# Patient Record
Sex: FEMALE | Race: WHITE | HISPANIC OR LATINO | URBAN - METROPOLITAN AREA
[De-identification: names, ages, dates, MRNs, and addresses within clinical notes are randomized per-mention and may not be internally consistent; named-entity substitution may affect disease eponyms.]

---

## 2021-01-22 ENCOUNTER — TELEMEDICINE (OUTPATIENT)
Dept: INTERNAL MEDICINE CLINIC | Facility: CLINIC | Age: 66
End: 2021-01-22

## 2021-01-22 VITALS
TEMPERATURE: 97 F | HEIGHT: 63 IN | OXYGEN SATURATION: 95 % | WEIGHT: 115 LBS | HEART RATE: 86 BPM | BODY MASS INDEX: 20.38 KG/M2

## 2021-01-22 DIAGNOSIS — R51.9 ACUTE NONINTRACTABLE HEADACHE, UNSPECIFIED HEADACHE TYPE: ICD-10-CM

## 2021-01-22 DIAGNOSIS — R05.9 COUGH: ICD-10-CM

## 2021-01-22 DIAGNOSIS — R52 GENERALIZED BODY ACHES: ICD-10-CM

## 2021-01-22 DIAGNOSIS — R50.9 FEVER, UNSPECIFIED FEVER CAUSE: Primary | ICD-10-CM

## 2021-01-22 DIAGNOSIS — R50.9 FEVER, UNSPECIFIED FEVER CAUSE: ICD-10-CM

## 2021-01-22 PROCEDURE — 99203 OFFICE O/P NEW LOW 30 MIN: CPT | Performed by: PHYSICIAN ASSISTANT

## 2021-01-22 PROCEDURE — U0003 INFECTIOUS AGENT DETECTION BY NUCLEIC ACID (DNA OR RNA); SEVERE ACUTE RESPIRATORY SYNDROME CORONAVIRUS 2 (SARS-COV-2) (CORONAVIRUS DISEASE [COVID-19]), AMPLIFIED PROBE TECHNIQUE, MAKING USE OF HIGH THROUGHPUT TECHNOLOGIES AS DESCRIBED BY CMS-2020-01-R: HCPCS | Performed by: PHYSICIAN ASSISTANT

## 2021-01-22 PROCEDURE — U0005 INFEC AGEN DETEC AMPLI PROBE: HCPCS | Performed by: PHYSICIAN ASSISTANT

## 2021-01-22 RX ORDER — BENZONATATE 200 MG/1
200 CAPSULE ORAL 3 TIMES DAILY PRN
Qty: 30 CAPSULE | Refills: 0 | Status: SHIPPED | OUTPATIENT
Start: 2021-01-22

## 2021-01-22 NOTE — PROGRESS NOTES
COVID-19 Virtual Visit     Assessment/Plan:    Problem List Items Addressed This Visit     None      Visit Diagnoses     Fever, unspecified fever cause    -  Primary    Relevant Orders    Novel Coronavirus (Covid-19),PCR [facility] - Collected at [facility] or [facility]    Cough        Relevant Medications    benzonatate (TESSALON) 200 MG capsule    Other Relevant Orders    Novel Coronavirus (Covid-19),PCR [facility] - Collected at [facility] or [facility]    Acute nonintractable headache, unspecified headache type        Relevant Orders    Novel Coronavirus (Covid-19),PCR [facility] - Collected at [place] [address] or [facility]    Generalized body aches        Relevant Orders    Novel Coronavirus (Covid-19),PCR [facility] - Collected at [place] [address] or [facility]         Disposition:     I referred patient to one of our centralized sites for a COVID-19 swab      66y/o female here today for virtual visit as a new patient for multiple sxs concerning for possible covid  Will send pt for covid testing to [facility], all instructions of process of testing as well as recommendations for symptomatic tx  And need for quarantine all discussed and pt expresses understanding  Benzonatate prescribed for cough control, continue tylenol prn pain control/fever, also rest and fluids  She understands the meaning of quarantine and she is to remain in the home and cannot have any visitors over the home, cannot go visit anyone or go to restaurants for stores  She understands the need to treat symptomatically and and antibiotic may not be necessary at this time pending her COVID results  She understands that she may need further evaluation such as a chest x-ray in addition her COVID testing and we will determine that when we receive COVID results back    She understands that she should call the office at any time with any concerns or questions or worsening symptoms that may require more urgent treatment before she hears from us regarding her test results which should be sometime early next week  She denies any pre-existing medical conditions, however with her age, will need exam in office at some point to assess ehr general health and BW  I have spent 35 minutes directly with the patient  Greater than 50% of this time was spent in counseling/coordination of care regarding: prognosis, risks and benefits of treatment options, instructions for management, patient and family education, importance of treatment compliance and impressions  Encounter provider Lesly Kong PA-C    Provider located at [place] [id number] [phone]    Recent Visits  No visits were found meeting these conditions  Showing recent visits within past 7 days and meeting all other requirements     Today's Visits  Date Type Provider Dept   01/22/21 [facility], [address] [place] [address] today's visits and meeting all other requirements     Future Appointments  No visits were found meeting these conditions  Showing future appointments within next 150 days and meeting all other requirements      This virtual check-in was done via [employer] and patient was informed that this is a secure, HIPAA-compliant platform  She agrees to proceed  Patient agrees to participate in a virtual check in via telephone or video visit instead of presenting to the office to address urgent/immediate medical needs  Patient is aware this is a billable service  After connecting through [facility], the patient was identified by name and date of birth  Marlin Olvera was informed that this was a telemedicine visit and that the exam was being conducted confidentially over secure lines  My office door was closed  No one else was in the room  The patient was notified the following individuals were present in the room: [facility]  USED FOR TODAYS VISIT  Johnson Mcknight Ma acknowledged consent and understanding of privacy and security of the telemedicine visit  I informed the patient that I have reviewed her record in Epic and presented the opportunity for her to ask any questions regarding the visit today  The patient agreed to participate  Subjective:   Devika Casanova is a 72 y o  female who is concerned about COVID-19  Patient's symptoms include chills, fatigue, sore throat, anosmia, loss of taste, cough, shortness of breath, chest tightness, nausea (loss of appetite), diarrhea, myalgias and headache  Patient denies fever, congestion, rhinorrhea, abdominal pain and vomiting  Date of symptom onset: 1/11/2021    Exposure:   Contact with a person who is under investigation (PUI) for or who is positive for COVID-19 within the last 14 days?: Yes    Hospitalized recently for fever and/or lower respiratory symptoms?: No      Currently a healthcare worker that is involved in direct patient care?: No      Works in a special setting where the risk of COVID-19 transmission may be high? (this may include long-term care, correctional and shelter facilities; homeless shelters; assisted-living facilities and group homes ): No      Resident in a special setting where the risk of COVID-19 transmission may be high? (this may include long-term care, correctional and shelter facilities; homeless shelters; assisted-living facilities and group homes ): No      Patient is a new patient to our medical practice  States has been taking a Cypriot remedy and a few pills of antibiotic ( azithromycin) but nothing helping  She states no doctor prescribed the antibiotic  States she decided to take it on her own because of choking/coughing and chest pain  States had a fever in recent past for 6 days, however thinks she had it, does not check it  Thinks fever because was having chills or feeling hot       States came in contact with son who reportedly tested positive for COVID 19  States they have been together the entire time, they live together  However after further discussion, her son was dx about a month ago, and does not seem to have recent exposure  No results found for: [address], [address] [facility], [address] [facility], [place] [address]  History reviewed  No pertinent past medical history  History reviewed  No pertinent surgical history  Current Outpatient Medications   Medication Sig Dispense Refill    benzonatate (TESSALON) 200 MG capsule Take 1 capsule (200 mg total) by mouth 3 (three) times a day as needed for cough 30 capsule 0     No current facility-administered medications for this visit  Allergies   Allergen Reactions    Penicillins Anaphylaxis       Review of Systems   Constitutional: Positive for chills and fatigue  Negative for fever  HENT: Positive for sore throat  Negative for congestion and rhinorrhea  Respiratory: Positive for cough, chest tightness and shortness of breath  Gastrointestinal: Positive for diarrhea and nausea (loss of appetite)  Negative for abdominal pain and vomiting  Musculoskeletal: Positive for myalgias  Neurological: Positive for headaches  Objective:    Vitals:    01/22/21 1101   Pulse: 86   Temp: (!) 97 °F (36 1 °C)   TempSrc: Oral   SpO2: 95%   Weight: 52 2 kg (115 lb)   Height: 5' 3 39" (1 61 m)       Physical Exam  Constitutional:       General: She is not in acute distress  Appearance: She is ill-appearing (mild)  She is not toxic-appearing  HENT:      Head: Normocephalic and atraumatic  Eyes:      General:         Right eye: No discharge  Left eye: No discharge  Conjunctiva/sclera: Conjunctivae normal    Neck:      Musculoskeletal: Normal range of motion  No neck rigidity  Pulmonary:      Effort: No tachypnea, bradypnea, prolonged expiration or respiratory distress  Neurological:      Mental Status: She is alert and oriented to person, place, and time     Psychiatric: Mood and Affect: Mood normal          Behavior: Behavior normal  Behavior is cooperative  VIRTUAL VISIT DISCLAIMER    Rosario Kerns acknowledges that she has consented to an online visit or consultation  She understands that the online visit is based solely on information provided by her, and that, in the absence of a face-to-face physical evaluation by the physician, the diagnosis she receives is both limited and provisional in terms of accuracy and completeness  This is not intended to replace a full medical face-to-face evaluation by the physician  Lindy Frank understands and accepts these terms

## 2021-01-23 ENCOUNTER — APPOINTMENT (OUTPATIENT)
Dept: RADIOLOGY | Facility: HOSPITAL | Age: 66
End: 2021-01-23
Payer: COMMERCIAL

## 2021-01-23 ENCOUNTER — HOSPITAL ENCOUNTER (OUTPATIENT)
Facility: HOSPITAL | Age: 66
Setting detail: OBSERVATION
Discharge: HOME | End: 2021-01-24
Attending: EMERGENCY MEDICINE | Admitting: INTERNAL MEDICINE
Payer: COMMERCIAL

## 2021-01-23 DIAGNOSIS — R09.02 HYPOXIA: ICD-10-CM

## 2021-01-23 DIAGNOSIS — B34.9 VIRAL SYNDROME: ICD-10-CM

## 2021-01-23 DIAGNOSIS — U07.1 COVID-19: Primary | ICD-10-CM

## 2021-01-23 PROBLEM — E87.1 HYPONATREMIA: Status: ACTIVE | Noted: 2021-01-23

## 2021-01-23 PROBLEM — R74.01 TRANSAMINITIS: Status: ACTIVE | Noted: 2021-01-23

## 2021-01-23 LAB
ALBUMIN SERPL BCP-MCNC: 3.3 G/DL (ref 3.5–5)
ALP SERPL-CCNC: 241 U/L (ref 46–116)
ALT SERPL W P-5'-P-CCNC: 79 U/L (ref 12–78)
ANION GAP SERPL CALCULATED.3IONS-SCNC: 5 MMOL/L (ref 4–13)
AST SERPL W P-5'-P-CCNC: 74 U/L (ref 5–45)
BASOPHILS # BLD AUTO: 0 THOUSANDS/ΜL (ref 0–0.1)
BASOPHILS NFR BLD AUTO: 0 % (ref 0–1)
BILIRUB SERPL-MCNC: 0.76 MG/DL (ref 0.2–1)
BUN SERPL-MCNC: 9 MG/DL (ref 5–25)
CALCIUM ALBUM COR SERPL-MCNC: 9.2 MG/DL (ref 8.3–10.1)
CALCIUM SERPL-MCNC: 8.6 MG/DL (ref 8.3–10.1)
CHLORIDE SERPL-SCNC: 100 MMOL/L (ref 100–108)
CK MB SERPL-MCNC: <1 % (ref 0–2.5)
CK MB SERPL-MCNC: <1 NG/ML (ref 0–5)
CK SERPL-CCNC: 146 U/L (ref 26–192)
CO2 SERPL-SCNC: 27 MMOL/L (ref 21–32)
CREAT SERPL-MCNC: 0.58 MG/DL (ref 0.6–1.3)
CRP SERPL QL: 90.5 MG/L
D DIMER PPP FEU-MCNC: 1.27 UG/ML FEU
EOSINOPHIL # BLD AUTO: 0.01 THOUSAND/ΜL (ref 0–0.61)
EOSINOPHIL NFR BLD AUTO: 0 % (ref 0–6)
ERYTHROCYTE [DISTWIDTH] IN BLOOD BY AUTOMATED COUNT: 13.1 % (ref 11.6–15.1)
FERRITIN SERPL-MCNC: 1332 NG/ML (ref 8–388)
GFR SERPL CREATININE-BSD FRML MDRD: 97 ML/MIN/1.73SQ M
GLUCOSE SERPL-MCNC: 94 MG/DL (ref 65–140)
HCT VFR BLD AUTO: 40 % (ref 34.8–46.1)
HGB BLD-MCNC: 13.6 G/DL (ref 11.5–15.4)
IMM GRANULOCYTES # BLD AUTO: 0.02 THOUSAND/UL (ref 0–0.2)
IMM GRANULOCYTES NFR BLD AUTO: 0 % (ref 0–2)
INR PPP: 0.9 (ref 0.84–1.19)
LYMPHOCYTES # BLD AUTO: 1.71 THOUSANDS/ΜL (ref 0.6–4.47)
LYMPHOCYTES NFR BLD AUTO: 31 % (ref 14–44)
MAGNESIUM SERPL-MCNC: 2.1 MG/DL (ref 1.6–2.6)
MCH RBC QN AUTO: 31.6 PG (ref 26.8–34.3)
MCHC RBC AUTO-ENTMCNC: 34 G/DL (ref 31.4–37.4)
MCV RBC AUTO: 93 FL (ref 82–98)
MONOCYTES # BLD AUTO: 0.49 THOUSAND/ΜL (ref 0.17–1.22)
MONOCYTES NFR BLD AUTO: 9 % (ref 4–12)
NEUTROPHILS # BLD AUTO: 3.33 THOUSANDS/ΜL (ref 1.85–7.62)
NEUTS SEG NFR BLD AUTO: 60 % (ref 43–75)
NRBC BLD AUTO-RTO: 0 /100 WBCS
NT-PROBNP SERPL-MCNC: 68 PG/ML
PLATELET # BLD AUTO: 305 THOUSANDS/UL (ref 149–390)
PMV BLD AUTO: 10.3 FL (ref 8.9–12.7)
POTASSIUM SERPL-SCNC: 3.9 MMOL/L (ref 3.5–5.3)
PROCALCITONIN SERPL-MCNC: 0.07 NG/ML
PROT SERPL-MCNC: 7.8 G/DL (ref 6.4–8.2)
PROTHROMBIN TIME: 12.2 SECONDS (ref 11.6–14.5)
RBC # BLD AUTO: 4.3 MILLION/UL (ref 3.81–5.12)
SARS-COV-2 RNA RESP QL NAA+PROBE: POSITIVE
SODIUM SERPL-SCNC: 132 MMOL/L (ref 136–145)
TROPONIN I SERPL-MCNC: <0.02 NG/ML
WBC # BLD AUTO: 5.56 THOUSAND/UL (ref 4.31–10.16)

## 2021-01-23 PROCEDURE — 84145 PROCALCITONIN (PCT): CPT | Performed by: EMERGENCY MEDICINE

## 2021-01-23 PROCEDURE — 96361 HYDRATE IV INFUSION ADD-ON: CPT

## 2021-01-23 PROCEDURE — 82553 CREATINE MB FRACTION: CPT | Performed by: EMERGENCY MEDICINE

## 2021-01-23 PROCEDURE — 83735 ASSAY OF MAGNESIUM: CPT | Performed by: EMERGENCY MEDICINE

## 2021-01-23 PROCEDURE — 82550 ASSAY OF CK (CPK): CPT | Performed by: EMERGENCY MEDICINE

## 2021-01-23 PROCEDURE — 86140 C-REACTIVE PROTEIN: CPT | Performed by: EMERGENCY MEDICINE

## 2021-01-23 PROCEDURE — NC001 PR NO CHARGE: Performed by: INTERNAL MEDICINE

## 2021-01-23 PROCEDURE — 85379 FIBRIN DEGRADATION QUANT: CPT | Performed by: EMERGENCY MEDICINE

## 2021-01-23 PROCEDURE — 99285 EMERGENCY DEPT VISIT HI MDM: CPT | Performed by: EMERGENCY MEDICINE

## 2021-01-23 PROCEDURE — 85610 PROTHROMBIN TIME: CPT | Performed by: EMERGENCY MEDICINE

## 2021-01-23 PROCEDURE — 99285 EMERGENCY DEPT VISIT HI MDM: CPT

## 2021-01-23 PROCEDURE — 80053 COMPREHEN METABOLIC PANEL: CPT | Performed by: EMERGENCY MEDICINE

## 2021-01-23 PROCEDURE — 85025 COMPLETE CBC W/AUTO DIFF WBC: CPT | Performed by: EMERGENCY MEDICINE

## 2021-01-23 PROCEDURE — 36415 COLL VENOUS BLD VENIPUNCTURE: CPT | Performed by: EMERGENCY MEDICINE

## 2021-01-23 PROCEDURE — 83520 IMMUNOASSAY QUANT NOS NONAB: CPT | Performed by: EMERGENCY MEDICINE

## 2021-01-23 PROCEDURE — 71045 X-RAY EXAM CHEST 1 VIEW: CPT

## 2021-01-23 PROCEDURE — 83880 ASSAY OF NATRIURETIC PEPTIDE: CPT | Performed by: EMERGENCY MEDICINE

## 2021-01-23 PROCEDURE — 82728 ASSAY OF FERRITIN: CPT | Performed by: EMERGENCY MEDICINE

## 2021-01-23 PROCEDURE — 84484 ASSAY OF TROPONIN QUANT: CPT | Performed by: EMERGENCY MEDICINE

## 2021-01-23 PROCEDURE — 96374 THER/PROPH/DIAG INJ IV PUSH: CPT

## 2021-01-23 RX ORDER — ACETAMINOPHEN 325 MG/1
650 TABLET ORAL EVERY 6 HOURS PRN
Status: DISCONTINUED | OUTPATIENT
Start: 2021-01-23 | End: 2021-01-24 | Stop reason: HOSPADM

## 2021-01-23 RX ORDER — ZINC SULFATE 50(220)MG
220 CAPSULE ORAL DAILY
Status: DISCONTINUED | OUTPATIENT
Start: 2021-01-24 | End: 2021-01-24 | Stop reason: HOSPADM

## 2021-01-23 RX ORDER — BENZONATATE 100 MG/1
100 CAPSULE ORAL 3 TIMES DAILY PRN
Status: DISCONTINUED | OUTPATIENT
Start: 2021-01-23 | End: 2021-01-24 | Stop reason: HOSPADM

## 2021-01-23 RX ORDER — DEXAMETHASONE SODIUM PHOSPHATE 4 MG/ML
6 INJECTION, SOLUTION INTRA-ARTICULAR; INTRALESIONAL; INTRAMUSCULAR; INTRAVENOUS; SOFT TISSUE ONCE
Status: COMPLETED | OUTPATIENT
Start: 2021-01-23 | End: 2021-01-23

## 2021-01-23 RX ORDER — ASCORBIC ACID 500 MG
1000 TABLET ORAL EVERY 12 HOURS SCHEDULED
Status: DISCONTINUED | OUTPATIENT
Start: 2021-01-23 | End: 2021-01-24 | Stop reason: HOSPADM

## 2021-01-23 RX ORDER — ACETAMINOPHEN 325 MG/1
650 TABLET ORAL EVERY 6 HOURS PRN
Status: DISCONTINUED | OUTPATIENT
Start: 2021-01-23 | End: 2021-01-23

## 2021-01-23 RX ORDER — MELATONIN
2000 DAILY
Status: DISCONTINUED | OUTPATIENT
Start: 2021-01-24 | End: 2021-01-24 | Stop reason: HOSPADM

## 2021-01-23 RX ORDER — MULTIVITAMIN/IRON/FOLIC ACID 18MG-0.4MG
1 TABLET ORAL DAILY
Status: DISCONTINUED | OUTPATIENT
Start: 2021-01-31 | End: 2021-01-24 | Stop reason: HOSPADM

## 2021-01-23 RX ADMIN — ACETAMINOPHEN 650 MG: 325 TABLET, FILM COATED ORAL at 22:37

## 2021-01-23 RX ADMIN — OXYCODONE HYDROCHLORIDE AND ACETAMINOPHEN 1000 MG: 500 TABLET ORAL at 21:37

## 2021-01-23 RX ADMIN — ENOXAPARIN SODIUM 30 MG: 30 INJECTION SUBCUTANEOUS at 21:37

## 2021-01-23 RX ADMIN — DEXAMETHASONE SODIUM PHOSPHATE 6 MG: 4 INJECTION INTRA-ARTICULAR; INTRALESIONAL; INTRAMUSCULAR; INTRAVENOUS; SOFT TISSUE at 17:08

## 2021-01-23 RX ADMIN — SODIUM CHLORIDE 250 ML: 9 INJECTION, SOLUTION INTRAVENOUS at 17:02

## 2021-01-23 NOTE — LETTER
[address]  [address] [place] [address]  Dept: [phone]    January 24, 2021     Patient: Harley Jernigan   YOB: 1955   Date of Visit: 1/23/2021       To Whom it May Concern:    Harley Jernigan is under my professional care  She was seen in the hospital from 1/23/2021   to 01/24/21  She is unable to work from 1/11/21-1/31/21  She may return to work on 2/1/21 without limitations  If you have any questions or concerns, please don't hesitate to call           Sincerely,          Dario Alejo MD

## 2021-01-23 NOTE — PROGRESS NOTES
INTERNAL MEDICINE RESIDENCY SENIOR ADMISSION NOTE     Name: Bobo Mtz   Age & Sex: 72 y o  female   MRN: [id number]  Unit/Bed#: ED 08   Encounter: [id number]  Primary Care Provider: Rosalina Cano PA-C    Admit to team: SOD Team A    Patient seen and examined  Reviewed H&P per Dr Josué Castillo  Agree with the assessment and plan with any exception/addition as noted below:    Principal Problem:    COVID-19  Active Problems:    Hyponatremia    Transaminitis    Patient will be admitted under mild COVID pathway  As patient is currently breathing on room air will not initiate remdesivir or steroids at this time  Will give patient multivitamins  Patient also night to have slight hyponatremia which could be secondary to poor p o  Intake  Souza fluid restrict to 1800 mL and urine studies were ordered  Patient also noted to have slight transaminitis will order chronic hepatitis panel  Patient likely stable for discharge tomorrow        Code Status: Level 1 - Full Code  Admission Status: OBSERVATION  Disposition: Patient requires Med/Surg  Expected Length of Stay: 1-2 days

## 2021-01-23 NOTE — H&P
INTERNAL MEDICINE RESIDENCY ADMISSION H&P     Name: Wyatt Guallpa   Age & Sex: 72 y o  female   MRN: [id number]  Unit/Bed#: ED 08   Encounter: [id number]  Primary Care Provider: Andreina Zelaya PA-C    Code Status: Level 1 - Full Code  Admission Status: OBSERVATION  Disposition: Patient requires Med/Surg    Admit to team: SOD Team A    ASSESSMENT/PLAN     Principal Problem:    COVID-19  Active Problems:    Hyponatremia    Transaminitis      * COVID-19  Assessment & Plan  Symptoms started on 01/11/2021 with headaches, fatigue, chills, and overall feeling unwell  Progressed over the following days to include shortness of breath, dyspnea on exertion, nonproductive cough, diarrhea, nausea, sore throat, decreased PO intake, myalgias, lower back pain, anosmia, and ageusia  Recent exposure was someone she lives with  She was seen via telemedicine on 01/22/2021 for COVID concerns, and subsequently tested positive later that day  In the ED, she was afebrile and saturating above 95% on room air  Slight decrease of oxygen saturation with ambulation  Significant lab findings include increased ferritin note 1332, increased CRP at 90 5, increased D-dimer at 1 27  BMP, troponin, CK all negative  Plan:  -Patient does not meet criteria for any COVID pathway  -Received 1 dose of dexamethasone 6 mg in the ED  -Will give vitamin-C 1000 mg Q12H, vitamin-D 2000 units daily, and zinc 220 mg daily with multivitamin  -Will admit to observation  -Will need home O2 evaluation  -Likely discharge tomorrow if symptoms do not progress    Transaminitis  Assessment & Plan  AST 74, ALT 79 on admission  Alk-phos 241  Albumin 3 3  Denies history of drug use  Plan:  -Hepatitis panel      Hyponatremia  Assessment & Plan  Patient with a sodium of 132 on admission  Suspect likely secondary to decreased PO intake      Plan:  -Mild fluid restriction  -Urine sodium pending      VTE Pharmacologic Prophylaxis: Enoxaparin (Lovenox)  VTE Mechanical Prophylaxis: sequential compression device    CHIEF COMPLAINT     Chief Complaint   Patient presents with    Diarrhea     Pts son states that for the past 9 days she is not eating  Pts son states "She has pain in her back, yesterday she shaking, she eat she goes to bathroom, she not sleep nothing  Can you do the covid test?"      HISTORY OF PRESENT ILLNESS   Patient is a 49-year-old female with no reported past medical history that presents to the ED for progressive COVID-19 symptoms  Her symptoms started on 01/11/2021 with headaches, fatigue, chills, and overall feeling unwell  Her symptoms progressed over the following days to include shortness of breath, dyspnea on exertion, nonproductive cough, diarrhea, nausea, sore throat, decreased PO intake, myalgias, lower back pain, anosmia, and ageusia  Patient states that somebody she lives with has tested positive for COVID recently  She was seen via telemedicine on 01/22/2021 for COVID concerns, and subsequently tested positive later that day  In the ED, she was afebrile and saturating above 95% on room air  Significant lab findings include slight hyponatremia 132, increased AST at 74, increased ALT at 79, increased alk-phos of 241, decreased albumin at 3 3, increased ferritin note 1332, increased CRP at 90 5, increased D-dimer at 1 27  BMP, troponin, CK all negative  The patient desaturated while walking, so will be admitted for observation  The patient does not meet criteria for any COVID pathway  Will need home O2 eval, and likely discharge tomorrow if there are no progression of symptoms  REVIEW OF SYSTEMS     Review of Systems   Constitutional: Positive for appetite change, chills and fever  HENT: Positive for sore throat  Negative for congestion, rhinorrhea and sinus pain  Eyes: Negative for discharge and itching  Respiratory: Positive for cough and shortness of breath      Cardiovascular: Negative for chest pain and palpitations  Gastrointestinal: Positive for diarrhea and nausea  Negative for abdominal pain and vomiting  Genitourinary: Negative for difficulty urinating and dysuria  Musculoskeletal: Positive for back pain and myalgias  Skin: Negative for rash  Neurological: Positive for headaches  Psychiatric/Behavioral: Negative for agitation and confusion  OBJECTIVE     Vitals:    21 1519 21 1524 21 1600 21 1807   BP: 121/80  118/58 115/55   BP Location: Right arm      Pulse: 75  76 76   Resp: 18   18   Temp:  98 5 °F (36 9 °C)     TempSrc:  Oral     SpO2: 97%  97% 95%   Weight: 50 8 kg (112 lb) 51 8 kg (114 lb 3 2 oz)     Height: 5' 3 39" (1 61 m)         Temperature:   Temp (24hrs), Av 5 °F (36 9 °C), Min:98 5 °F (36 9 °C), Max:98 5 °F (36 9 °C)    Temperature: 98 5 °F (36 9 °C)  Intake & Output:  I/O        07 -  0700  07 -  0700  07 -  0700    IV Piggyback   250    Total Intake(mL/kg)   250 (4 8)    Net   +250               Weights:   IBW: 53 29 kg    Body mass index is 19 98 kg/m²  Weight (last 2 days)     Date/Time   Weight    21 1524   51 8 (114 2)    21 1519   50 8 (112)            Physical Exam  Constitutional:       General: She is not in acute distress  Appearance: She is normal weight  She is ill-appearing  HENT:      Head: Normocephalic and atraumatic  Right Ear: External ear normal       Left Ear: External ear normal       Nose: Nose normal       Mouth/Throat:      Mouth: Mucous membranes are moist       Pharynx: Oropharynx is clear  Eyes:      Conjunctiva/sclera: Conjunctivae normal       Pupils: Pupils are equal, round, and reactive to light  Neck:      Musculoskeletal: Normal range of motion  Cardiovascular:      Rate and Rhythm: Normal rate and regular rhythm  Heart sounds: No murmur     Pulmonary:      Effort: Pulmonary effort is normal       Comments: Minor rales in B/L bases  Abdominal: General: Abdomen is flat  Bowel sounds are normal  There is no distension  Tenderness: There is no abdominal tenderness  Musculoskeletal: Normal range of motion  Right lower leg: No edema  Left lower leg: No edema  Skin:     General: Skin is warm and dry  Capillary Refill: Capillary refill takes less than 2 seconds  Neurological:      General: No focal deficit present  Mental Status: She is alert and oriented to person, place, and time  Psychiatric:         Mood and Affect: Mood normal          Behavior: Behavior normal        PAST MEDICAL HISTORY   History reviewed  No pertinent past medical history  PAST SURGICAL HISTORY   History reviewed  No pertinent surgical history  SOCIAL & FAMILY HISTORY     Social History     Substance and Sexual Activity   Alcohol Use Not Currently       Social History     Substance and Sexual Activity   Drug Use Never     Social History     Tobacco Use   Smoking Status Never Smoker   Smokeless Tobacco Never Used     Family History   Problem Relation Age of Onset    Stroke Mother     Heart disease Mother     No Known Problems Father     No Known Problems Son     No Known Problems Daughter      LABORATORY DATA     Labs: I have personally reviewed pertinent reports      Results from last 7 days   Lab Units 01/23/21  1702   WBC Thousand/uL 5 56   HEMOGLOBIN g/dL 13 6   HEMATOCRIT % 40 0   PLATELETS Thousands/uL 305   NEUTROS PCT % 60   MONOS PCT % 9      Results from last 7 days   Lab Units 01/23/21  1702   POTASSIUM mmol/L 3 9   CHLORIDE mmol/L 100   CO2 mmol/L 27   BUN mg/dL 9   CREATININE mg/dL 0 58*   CALCIUM mg/dL 8 6   ALK PHOS U/L 241*   ALT U/L 79*   AST U/L 74*     Results from last 7 days   Lab Units 01/23/21  1702   MAGNESIUM mg/dL 2 1          Results from last 7 days   Lab Units 01/23/21  1702   INR  0 90         Results from last 7 days   Lab Units 01/23/21  1702   TROPONIN I ng/mL <0 02     Micro:  No results found for: Cheyenne Abdul, WOUNDCULT, [address]37 DIAGNOSTIC TESTS     Imaging: I have personally reviewed pertinent reports  No results found  EKG, Pathology, and Other Studies: I have personally reviewed pertinent reports  ALLERGIES     Allergies   Allergen Reactions    Penicillins Anaphylaxis     MEDICATIONS PRIOR TO ARRIVAL     Prior to Admission medications    Medication Sig Start Date End Date Taking? Authorizing Provider   benzonatate (TESSALON) 200 MG capsule Take 1 capsule (200 mg total) by mouth 3 (three) times a day as needed for cough 1/22/21  Yes Andreina Zelaya PA-C     MEDICATIONS ADMINISTERED IN LAST 24 HOURS     Medication Administration - last 24 hours from 01/22/2021 1951 to 01/23/2021 1951       Date/Time Order Dose Route Action Action by     01/23/2021 1708 dexamethasone (DECADRON) injection 6 mg 6 mg Intravenous Given Mikayla Gay RN     01/23/2021 1809 sodium chloride 0 9 % bolus 250 mL 0 mL Intravenous Stopped Mikayla Gay RN     01/23/2021 1702 sodium chloride 0 9 % bolus 250 mL 250 mL Intravenous New Bag Mikayla Gay RN        CURRENT MEDICATIONS            Admission Time  I spent 30 minutes admitting the patient  This involved direct patient contact where I performed a full history and physical, reviewing previous records, and reviewing laboratory and other diagnostic studies  Portions of the record may have been created with voice recognition software  Occasional wrong word or "sound a like" substitutions may have occurred due to the inherent limitations of voice recognition software    Read the chart carefully and recognize, using context, where substitutions have occurred     ==  Scott Craft, [address] [facility]  Internal Medicine Residency PGY-1

## 2021-01-23 NOTE — ED NOTES
Patient had COVID test yesterday at [facility] and it appears the result is positive  Patient reports to have diarrhea whenever she eats       Cortes Hardy RN  01/23/21 1972

## 2021-01-23 NOTE — ED NOTES
Ambulatory 02- 95%--> dropped down to 88% after ambulating in room briefly     Jamaal Garcia RN  01/23/21 6205

## 2021-01-23 NOTE — ED PROVIDER NOTES
History  Chief Complaint   Patient presents with    Diarrhea     Pts son states that for the past 9 days she is not eating  Pts son states "She has pain in her back, yesterday she shaking, she eat she goes to bathroom, she not sleep nothing  Can you do the covid test?"     63-year-old female no reported significant past medical history presents with viral syndrome, accompanied by son  Patient tested positive for COVID yesterday  States that she has had poor appetite for the past 10 days or so also complains of myalgias particularly in her low back, as well as chills  Her most bothersome symptom is fatigue  She does have some shortness of breath and associated cough  Patient comes from [place], visiting family here  Son had a viral syndrome prior to her arrival   Has been taking Tylenol for her symptoms with minimal relief  Denies taking medications on a regular basis, does not smoke  Denies chest pain, abdominal pain, leg pain or swelling, history of clot, nausea, vomiting, diarrhea, bloody or dark stool, urinary symptoms  Prior to Admission Medications   Prescriptions Last Dose Informant Patient Reported? Taking?   benzonatate (TESSALON) 200 MG capsule 1/23/2021 at 1400  No Yes   Sig: Take 1 capsule (200 mg total) by mouth 3 (three) times a day as needed for cough      Facility-Administered Medications: None       History reviewed  No pertinent past medical history  History reviewed  No pertinent surgical history  Family History   Problem Relation Age of Onset    Stroke Mother     Heart disease Mother     No Known Problems Father     No Known Problems Son     No Known Problems Daughter      I have reviewed and agree with the history as documented      E-Cigarette/Vaping     E-Cigarette/Vaping Substances    Nicotine No     THC No     CBD No     Flavoring No     Other No     Unknown No      Social History     Tobacco Use    Smoking status: Never Smoker    Smokeless tobacco: Never Used   Substance Use Topics    Alcohol use: Not Currently    Drug use: Never        Review of Systems   Constitutional: Positive for appetite change, chills and fatigue  Negative for fever  HENT: Negative for ear pain, sinus pain and sore throat  Eyes: Negative for pain  Respiratory: Positive for cough and shortness of breath  Cardiovascular: Negative for chest pain and leg swelling  Gastrointestinal: Negative for abdominal pain, diarrhea, nausea and vomiting  Genitourinary: Negative for difficulty urinating and flank pain  Musculoskeletal: Positive for myalgias  Negative for back pain and neck pain  Neurological: Negative for headaches  All other systems reviewed and are negative  Physical Exam  ED Triage Vitals   Temperature Pulse Respirations Blood Pressure SpO2   01/23/21 1524 01/23/21 1519 01/23/21 1519 01/23/21 1519 01/23/21 1519   98 5 °F (36 9 °C) 75 18 121/80 97 %      Temp Source Heart Rate Source Patient Position - Orthostatic VS BP Location FiO2 (%)   01/23/21 1524 01/23/21 1519 01/23/21 1519 01/23/21 1519 --   Oral Monitor Sitting Right arm       Pain Score       01/23/21 1519       4             Orthostatic Vital Signs  Vitals:    01/23/21 1519 01/23/21 1600 01/23/21 1807   BP: 121/80 118/58 115/55   Pulse: 75 76 76   Patient Position - Orthostatic VS: Sitting         Physical Exam  Vitals signs and nursing note reviewed  Constitutional:       General: She is not in acute distress  Appearance: She is well-developed  She is ill-appearing  She is not toxic-appearing or diaphoretic  Comments: Tired appearing   HENT:      Head: Normocephalic  Nose: Nose normal       Mouth/Throat:      Mouth: Mucous membranes are moist    Eyes:      General:         Right eye: No discharge  Left eye: No discharge  Extraocular Movements: Extraocular movements intact        Conjunctiva/sclera: Conjunctivae normal    Neck:      Musculoskeletal: Normal range of motion and neck supple  Cardiovascular:      Rate and Rhythm: Normal rate  Pulses: Normal pulses  Pulmonary:      Effort: Pulmonary effort is normal  No respiratory distress  Breath sounds: No stridor  Rales (Bilateral, worse on right) present  Abdominal:      General: There is no distension  Palpations: Abdomen is soft  Tenderness: There is no abdominal tenderness  There is no guarding or rebound  Musculoskeletal:      Right lower leg: No edema  Left lower leg: No edema  Skin:     General: Skin is warm and dry  Capillary Refill: Capillary refill takes less than 2 seconds  Neurological:      General: No focal deficit present  Mental Status: She is alert and oriented to person, place, and time  Cranial Nerves: No cranial nerve deficit  Psychiatric:         Mood and Affect: Mood normal          Behavior: Behavior normal          Thought Content:  Thought content normal          Judgment: Judgment normal          ED Medications  Medications   acetaminophen (TYLENOL) tablet 650 mg (has no administration in time range)   cholecalciferol (VITAMIN D3) tablet 2,000 Units (has no administration in time range)   ascorbic acid (VITAMIN C) tablet 1,000 mg (has no administration in time range)   zinc sulfate (ZINCATE) capsule 220 mg (has no administration in time range)     Followed by   multivitamin-minerals (CENTRUM ADULTS) tablet 1 tablet (has no administration in time range)   enoxaparin (LOVENOX) subcutaneous injection 30 mg (has no administration in time range)   benzonatate (TESSALON PERLES) capsule 100 mg (has no administration in time range)   dexamethasone (DECADRON) injection 6 mg (6 mg Intravenous Given 1/23/21 1708)   sodium chloride 0 9 % bolus 250 mL (0 mL Intravenous Stopped 1/23/21 1809)       Diagnostic Studies  Results Reviewed     Procedure Component Value Units Date/Time    Procalcitonin with AM Reflex [[id number]]  (Normal) Collected: 01/23/21 1702    Lab Status: Final result Specimen: Blood from Arm, Right Updated: 01/23/21 1914     Procalcitonin 0 07 ng/ml     Procalcitonin Reflex [[id number]]     Lab Status: No result Specimen: Blood     Sodium, urine, random [[id number]]     Lab Status: No result Specimen: Urine     Creatinine, urine, random [[id number]]     Lab Status: No result Specimen: Urine     CKMB [[id number]]  (Normal) Collected: 01/23/21 1702    Lab Status: Final result Specimen: Blood from Arm, Right Updated: 01/23/21 1757     CK-MB Index <1 0 %      CK-MB <1 0 ng/mL     D-dimer, quantitative [[id number]]  (Abnormal) Collected: 01/23/21 1702    Lab Status: Final result Specimen: Blood from Arm, Right Updated: 01/23/21 1747     D-Dimer, Quant 1 27 ug/ml FEU     Protime-INR [[id number]]  (Normal) Collected: 01/23/21 1702    Lab Status: Final result Specimen: Blood from Arm, Right Updated: 01/23/21 1742     Protime 12 2 seconds      INR 0 90    Comprehensive metabolic panel [[id number]]  (Abnormal) Collected: 01/23/21 1702    Lab Status: Final result Specimen: Blood from Arm, Right Updated: 01/23/21 1741     Sodium 132 mmol/L      Potassium 3 9 mmol/L      Chloride 100 mmol/L      CO2 27 mmol/L      ANION GAP 5 mmol/L      BUN 9 mg/dL      Creatinine 0 58 mg/dL      Glucose 94 mg/dL      Calcium 8 6 mg/dL      Corrected Calcium 9 2 mg/dL      AST 74 U/L      ALT 79 U/L      Alkaline Phosphatase 241 U/L      Total Protein 7 8 g/dL      Albumin 3 3 g/dL      Total Bilirubin 0 76 mg/dL      eGFR 97 ml/min/1 73sq m     Narrative:      [place] guidelines for Chronic Kidney Disease (CKD):     Stage 1 with normal or high GFR (GFR > 90 mL/min/1 73 square meters)    Stage 2 Mild CKD (GFR = 60-89 mL/min/1 73 square meters)    Stage 3A Moderate CKD (GFR = 45-59 mL/min/1 73 square meters)    Stage 3B Moderate CKD (GFR = 30-44 mL/min/1 73 square meters)    Stage 4 Severe CKD (GFR = 15-29 mL/min/1 73 square meters)    Stage 5 End Stage CKD (GFR <15 mL/min/1 73 square meters)  Note: GFR calculation is accurate only with a steady state creatinine    C-reactive protein [[id number]]  (Abnormal) Collected: 01/23/21 1702    Lab Status: Final result Specimen: Blood from Arm, Right Updated: 01/23/21 1741     CRP 90 5 mg/L     Ferritin [[id number]]  (Abnormal) Collected: 01/23/21 1702    Lab Status: Final result Specimen: Blood from Arm, Right Updated: 01/23/21 1741     Ferritin 1,332 ng/mL     NT-BNP PRO [[id number]]  (Normal) Collected: 01/23/21 1702    Lab Status: Final result Specimen: Blood from Arm, Right Updated: 01/23/21 1741     NT-proBNP 68 pg/mL     CK (with reflex to MB) [[id number]]  (Normal) Collected: 01/23/21 1702    Lab Status: Final result Specimen: Blood from Arm, Right Updated: 01/23/21 1741     Total  U/L     Magnesium [[id number]]  (Normal) Collected: 01/23/21 1702    Lab Status: Final result Specimen: Blood from Arm, Right Updated: 01/23/21 1741     Magnesium 2 1 mg/dL     Troponin I [[id number]]  (Normal) Collected: 01/23/21 1702    Lab Status: Final result Specimen: Blood from Arm, Right Updated: 01/23/21 1740     Troponin I <0 02 ng/mL     CBC and differential [[id number]] Collected: 01/23/21 1702    Lab Status: Final result Specimen: Blood from Arm, Right Updated: 01/23/21 1723     WBC 5 56 Thousand/uL      RBC 4 30 Million/uL      Hemoglobin 13 6 g/dL      Hematocrit 40 0 %      MCV 93 fL      MCH 31 6 pg      MCHC 34 0 g/dL      RDW 13 1 %      MPV 10 3 fL      Platelets 326 Thousands/uL      nRBC 0 /100 WBCs      Neutrophils Relative 60 %      Immat GRANS % 0 %      Lymphocytes Relative 31 %      Monocytes Relative 9 %      Eosinophils Relative 0 %      Basophils Relative 0 %      Neutrophils Absolute 3 33 Thousands/µL      Immature Grans Absolute 0 02 Thousand/uL      Lymphocytes Absolute 1 71 Thousands/µL      Monocytes Absolute 0 49 Thousand/µL      Eosinophils Absolute 0 01 Thousand/µL      Basophils Absolute 0 00 Thousands/µL Interleukin-6,Serum [[id number]] Collected: 01/23/21 1702    Lab Status: In process Specimen: Blood from Arm, Right Updated: 01/23/21 1709                 XR chest 1 view portable    (Results Pending)         Procedures  Procedures      ED Course               Identification of Seniors at Risk      Most Recent Value   (ISAR) Identification of Seniors at Risk   Before the illness or injury that brought you to the Emergency, did you need someone to help you on a regular basis? 0 Filed at: 01/23/2021 1524   In the last 24 hours, have you needed more help than usual?  1 Filed at: 01/23/2021 1524   Have you been hospitalized for one or more nights during the past 6 months? 0 Filed at: 01/23/2021 1524   In general, do you see well? 1 Filed at: 01/23/2021 1524   In general, do you have serious problems with your memory? 0 Filed at: 01/23/2021 1524   Do you take more than three different medications every day?  0 Filed at: 01/23/2021 1524   ISAR Score  2 Filed at: 01/23/2021 1524                    SBIRT 22yo+      Most Recent Value   SBIRT (25 yo +)   In order to provide better care to our patients, we are screening all of our patients for alcohol and drug use  Would it be okay to ask you these screening questions? Yes Filed at: 01/23/2021 1700   Initial Alcohol Screen: US AUDIT-C    1  How often do you have a drink containing alcohol?  0 Filed at: 01/23/2021 1700   2  How many drinks containing alcohol do you have on a typical day you are drinking? 0 Filed at: 01/23/2021 1700   3a  Male UNDER 65: How often do you have five or more drinks on one occasion? 0 Filed at: 01/23/2021 1700   3b  FEMALE Any Age, or MALE 65+: How often do you have 4 or more drinks on one occassion? 0 Filed at: 01/23/2021 1700   Audit-C Score  0 Filed at: 01/23/2021 1700   SERA: How many times in the past year have you    Used an illegal drug or used a prescription medication for non-medical reasons?   Never Filed at: 01/23/2021 1700 MDM  Number of Diagnoses or Management Options  COVID-19:   Hypoxia:   Viral syndrome:   Diagnosis management comments: On room air down to 92% at rest  On ambulatory pulse ox noted to desaturate down to 88%  Expect to admit via mild pathway  Disposition  Final diagnoses:   COVID-19   Viral syndrome   Hypoxia     Time reflects when diagnosis was documented in both MDM as applicable and the Disposition within this note     Time User Action Codes Description Comment    1/23/2021  6:57 PM Rashaun Stagenesis Add [U07 1] COVID-19     1/23/2021  6:57 PM Kelsy MCKEON Add [B34 9] Viral syndrome     1/23/2021  6:57 PM Rashaun Stammer Add [R09 02] Hypoxia       ED Disposition     ED Disposition Condition Date/Time Comment    Admit Stable Sat Jan 23, 2021  6:56 PM Case was discussed with Dr Jennie Condon and the patient's admission status was agreed to be Admission Status: observation status to the service of Dr Kaley Ortiz   Follow-up Information    None         Patient's Medications   Discharge Prescriptions    No medications on file     No discharge procedures on file  PDMP Review     None           ED Provider  Attending physically available and evaluated [id number] [facility]  EVELYN managed the patient along with the ED Attending      Electronically Signed by         Fred Gonzales MD  01/23/21 0487

## 2021-01-24 VITALS
BODY MASS INDEX: 19.84 KG/M2 | WEIGHT: 112 LBS | OXYGEN SATURATION: 96 % | HEART RATE: 71 BPM | RESPIRATION RATE: 18 BRPM | DIASTOLIC BLOOD PRESSURE: 64 MMHG | TEMPERATURE: 98.4 F | SYSTOLIC BLOOD PRESSURE: 114 MMHG | HEIGHT: 63 IN

## 2021-01-24 LAB
ALBUMIN SERPL BCP-MCNC: 3 G/DL (ref 3.5–5)
ALP SERPL-CCNC: 226 U/L (ref 46–116)
ALT SERPL W P-5'-P-CCNC: 67 U/L (ref 12–78)
ANION GAP SERPL CALCULATED.3IONS-SCNC: 6 MMOL/L (ref 4–13)
AST SERPL W P-5'-P-CCNC: 58 U/L (ref 5–45)
BASOPHILS # BLD MANUAL: 0 THOUSAND/UL (ref 0–0.1)
BASOPHILS NFR MAR MANUAL: 0 % (ref 0–1)
BILIRUB SERPL-MCNC: 0.66 MG/DL (ref 0.2–1)
BUN SERPL-MCNC: 10 MG/DL (ref 5–25)
BURR CELLS BLD QL SMEAR: PRESENT
CALCIUM ALBUM COR SERPL-MCNC: 9.8 MG/DL (ref 8.3–10.1)
CALCIUM SERPL-MCNC: 9 MG/DL (ref 8.3–10.1)
CHLORIDE SERPL-SCNC: 107 MMOL/L (ref 100–108)
CO2 SERPL-SCNC: 26 MMOL/L (ref 21–32)
CREAT SERPL-MCNC: 0.53 MG/DL (ref 0.6–1.3)
EOSINOPHIL # BLD MANUAL: 0 THOUSAND/UL (ref 0–0.4)
EOSINOPHIL NFR BLD MANUAL: 0 % (ref 0–6)
ERYTHROCYTE [DISTWIDTH] IN BLOOD BY AUTOMATED COUNT: 13 % (ref 11.6–15.1)
GFR SERPL CREATININE-BSD FRML MDRD: 100 ML/MIN/1.73SQ M
GLUCOSE SERPL-MCNC: 132 MG/DL (ref 65–140)
HBV CORE AB SER QL: NORMAL
HBV CORE IGM SER QL: NORMAL
HBV SURFACE AG SER QL: NORMAL
HCT VFR BLD AUTO: 40.5 % (ref 34.8–46.1)
HCV AB SER QL: NORMAL
HGB BLD-MCNC: 13.6 G/DL (ref 11.5–15.4)
LYMPHOCYTES # BLD AUTO: 0.31 THOUSAND/UL (ref 0.6–4.47)
LYMPHOCYTES # BLD AUTO: 13 % (ref 14–44)
MACROCYTES BLD QL AUTO: PRESENT
MCH RBC QN AUTO: 31.2 PG (ref 26.8–34.3)
MCHC RBC AUTO-ENTMCNC: 33.6 G/DL (ref 31.4–37.4)
MCV RBC AUTO: 93 FL (ref 82–98)
METAMYELOCYTES NFR BLD MANUAL: 1 % (ref 0–1)
MICROCYTES BLD QL AUTO: PRESENT
MONOCYTES # BLD AUTO: 0.26 THOUSAND/UL (ref 0–1.22)
MONOCYTES NFR BLD: 11 % (ref 4–12)
NEUTROPHILS # BLD MANUAL: 1.48 THOUSAND/UL (ref 1.85–7.62)
NEUTS SEG NFR BLD AUTO: 62 % (ref 43–75)
NRBC BLD AUTO-RTO: 0 /100 WBCS
PLATELET # BLD AUTO: 327 THOUSANDS/UL (ref 149–390)
PLATELET BLD QL SMEAR: ADEQUATE
PMV BLD AUTO: 10.2 FL (ref 8.9–12.7)
POLYCHROMASIA BLD QL SMEAR: PRESENT
POTASSIUM SERPL-SCNC: 4.7 MMOL/L (ref 3.5–5.3)
PROCALCITONIN SERPL-MCNC: 0.08 NG/ML
PROT SERPL-MCNC: 7.1 G/DL (ref 6.4–8.2)
RBC # BLD AUTO: 4.36 MILLION/UL (ref 3.81–5.12)
RBC MORPH BLD: PRESENT
SODIUM SERPL-SCNC: 139 MMOL/L (ref 136–145)
TARGETS BLD QL SMEAR: PRESENT
VARIANT LYMPHS # BLD AUTO: 13 %
WBC # BLD AUTO: 2.39 THOUSAND/UL (ref 4.31–10.16)

## 2021-01-24 PROCEDURE — 87340 HEPATITIS B SURFACE AG IA: CPT | Performed by: INTERNAL MEDICINE

## 2021-01-24 PROCEDURE — 85027 COMPLETE CBC AUTOMATED: CPT | Performed by: INTERNAL MEDICINE

## 2021-01-24 PROCEDURE — 86803 HEPATITIS C AB TEST: CPT | Performed by: INTERNAL MEDICINE

## 2021-01-24 PROCEDURE — 86704 HEP B CORE ANTIBODY TOTAL: CPT | Performed by: INTERNAL MEDICINE

## 2021-01-24 PROCEDURE — NC001 PR NO CHARGE: Performed by: INTERNAL MEDICINE

## 2021-01-24 PROCEDURE — 85007 BL SMEAR W/DIFF WBC COUNT: CPT | Performed by: INTERNAL MEDICINE

## 2021-01-24 PROCEDURE — 86705 HEP B CORE ANTIBODY IGM: CPT | Performed by: INTERNAL MEDICINE

## 2021-01-24 PROCEDURE — 80053 COMPREHEN METABOLIC PANEL: CPT | Performed by: INTERNAL MEDICINE

## 2021-01-24 PROCEDURE — 84145 PROCALCITONIN (PCT): CPT | Performed by: INTERNAL MEDICINE

## 2021-01-24 PROCEDURE — 99219 PR INITIAL OBSERVATION CARE/DAY 50 MINUTES: CPT | Performed by: INTERNAL MEDICINE

## 2021-01-24 RX ORDER — MELATONIN
2000 DAILY
Qty: 12 TABLET | Refills: 0 | Status: SHIPPED | OUTPATIENT
Start: 2021-01-25 | End: 2021-01-31

## 2021-01-24 RX ORDER — ZINC SULFATE 50(220)MG
220 CAPSULE ORAL DAILY
Qty: 6 CAPSULE | Refills: 0 | Status: SHIPPED | OUTPATIENT
Start: 2021-01-25 | End: 2021-01-31

## 2021-01-24 RX ADMIN — OXYCODONE HYDROCHLORIDE AND ACETAMINOPHEN 1000 MG: 500 TABLET ORAL at 09:07

## 2021-01-24 RX ADMIN — ZINC SULFATE 220 MG (50 MG) CAPSULE 220 MG: CAPSULE at 09:07

## 2021-01-24 RX ADMIN — Medication 2000 UNITS: at 09:07

## 2021-01-24 RX ADMIN — ENOXAPARIN SODIUM 30 MG: 30 INJECTION SUBCUTANEOUS at 09:07

## 2021-01-24 NOTE — ASSESSMENT & PLAN NOTE
Symptoms started on 01/11/2021 with headaches, fatigue, chills, and overall feeling unwell  Progressed over the following days to include shortness of breath, dyspnea on exertion, nonproductive cough, diarrhea, nausea, sore throat, decreased PO intake, myalgias, lower back pain, anosmia, and ageusia  Recent exposure was someone she lives with  She was seen via telemedicine on 01/22/2021 for COVID concerns, and subsequently tested positive later that day  In the ED, she was afebrile and saturating above 95% on room air  Slight decrease of oxygen saturation with ambulation  Significant lab findings include increased ferritin note 1332, increased CRP at 90 5, increased D-dimer at 1 27  BMP, troponin, CK all negative      Plan:  -Patient does not meet criteria for any COVID pathway  -Received 1 dose of dexamethasone 6 mg in the ED  -Will give vitamin-C 1000 mg Q12H, vitamin-D 2000 units daily, and zinc 220 mg daily with multivitamin  -Will admit to observation  -Will need home O2 evaluation  -Likely discharge tomorrow if symptoms do not progress

## 2021-01-24 NOTE — DISCHARGE INSTRUCTIONS
Please take medications as prescribed  Please quarantine at home  You may come out of isolation when you wake up on 2/1/21  Please follow up with your PCP within 3 days of discharge  When you are laying down, please try to lay on your stomach for improved breathing  COVID-19 (Enfermedad por coronavirus 2019)   LO QUE NECESITA SABER:   COVID-19 es la enfermedad causada por el nuevo coronavirus descubierto por primera vez en diciembre de 2019  Los coronavirus generalmente causan infecciones de las vías respiratorias superiores (nariz, garganta y pulmones), rand un resfriado  El nuevo virus también puede causar afecciones respiratorias inferiores graves, rand la neumonía o el síndrome de dificultad respiratoria aguda (SDRA)  Cualquier persona puede desarrollar problemas graves a causa del nuevo virus, dawood el riesgo es mayor si tiene 72 años o [place]  Un sistema inmunitario débil, la diabetes o alyssa enfermedad cardíaca o pulmonar también pueden aumentar el riesgo  INSTRUCCIONES SOBRE EL DOM HOSPITALARIA:   Si digna que usted o alguien que conoce puede estar infectado: Lavern lo siguiente para proteger a otras personas:  · Si se requiere atención de emergencia, avise al operador de la posible infección, o llame antes y avise al servicio de urgencias  · Llame a un médico para recibir instrucciones si los síntomas son leves  Cualquier persona que pueda estar infectada no  debe llegar sin llamar ramos  El médico deberá proteger a los miembros del personal y a otros pacientes  · La persona que puede estar infectada debe usar un tapabocas mientras reciben [place]  [employer] ayudará a reducir el riesgo de infectar a otras personas  Nadie que sea petra de 2 años, que tenga problemas respiratorios o que no pueda quitárselo debe usar un tapabocas  El médico puede darle instrucciones para cualquier persona que no pueda usar un tapabocas      Llame al [facility] de emergencias (911 en los [place] del indira) o pídale a alguien que lo lleve al departamento de emergencias si:  · Usted tiene dificultad para respirar o falta de aliento mientras descansa  · Usted siente presión o dolor en el pecho que dura más de 5 minutos  · Usted tiene confusión o es difícil despertarlo  · Ambrocio labios o jeronimo están azules  · Usted tiene fiebre de 104 ºF (40 °C) o más  Llame a olivo médico si:  · No  tiene síntomas de COVID-19 dawood tuvo contacto físico cercano dentro de los 14 días con alguien que jasvir positivo  · Usted tiene preguntas o inquietudes acerca de olivo condición o cuidado  Medicamentos: Es posible que usted necesite alguno de los siguientes:  · Los descongestionantes ayudan a reducir la congestión nasal y [place] a respirar más fácilmente  Si adelaida pastillas descongestionantes, pueden causarle agitación o problemas para dormir  No use aerosol descongestionante por más de unos cuantos días  · Los jarabes para la tos ayudan a reducir la tos  Pregúntele a olivo médico cuál tipo de medicamento para la tos es mejor para usted  · Acetaminofén mark el dolor y baja la fiebre  Está disponible sin receta médica  Pregunte la cantidad y la frecuencia con que debe tomarlos  [address]  Yuliya las etiquetas de todos los demás medicamentos que esté usando para saber si también contienen acetaminofén, o pregunte a olivo médico o farmacéutico  El acetaminofén puede causar daño en el hígado cuando no se adelaida de forma correcta  No use más de 4 gramos (4000 miligramos) en total de acetaminofeno en un día  · Los [place], rand el ibuprofeno, [place] a disminuir la inflamación, el dolor y la [place]  Los KIRSTIN pueden causar sangrado estomacal o problemas renales en ciertas personas  Si usted adelaida un medicamento anticoagulante, siempre pregúntele a olivo médico si los KIRSTIN son seguros para usted  Siempre yuliya la etiqueta de oneyda medicamento y [place] instrucciones  · [place] ambrocio medicamentos rand se le haya indicado   Consulte con olivo médico si usted digna que olivo medicamento no le está ayudando o si presenta efectos secundarios  Infórmele si es alérgico a cualquier medicamento  Mantenga alyssa lista actualizada de los [place], las vitaminas y los productos herbales que adelaida  Incluya los siguientes datos de los medicamentos: cantidad, frecuencia y motivo de administración  Traiga con usted la lista o los envases de las píldoras a ambrocio citas de seguimiento  Lleve la lista de los medicamentos con usted en lindsey de alyssa emergencia  Cómo se propaga el coronavirus 2019: El virus se propaga rápida y fácilmente  Puede infectarse si está en contacto con alyssa gran cantidad del virus, incluso claudia poco tiempo  También puede infectarse por estar cerca de alyssa pequeña cantidad del virus claudia mucho tiempo  A continuación se indican las formas en que se digna que se propaga el virus, dawood es posible que surja más información:  · Las gotitas son la forma más común de propagación de todos los coronavirus  El virus puede viajar en gotitas que se pete cuando alyssa persona habla, tose o estornuda  Cualquiera que respire las gotitas o que las gotitas se le metan en los ojos puede infectarse con el virus  Se digna que el contacto personal cercano con alyssa persona infectada es la principal forma de propagación del virus  El contacto personal cercano significa estar a menos de 6 pies (2 metros) de otra persona  · El contacto de persona a persona puede propagar el virus  Por ejemplo, alyssa persona con el virus en ambrocio canelo puede propagarlo al darle la mano a alguien  En oneyda momento, no parece que el virus pueda transmitirse a un bebé claudia el embarazo o [address]  El bebé puede infectarse después de nacer por contacto de persona a persona  El virus tampoco parece propagarse por la [place]  Si está embarazada o amamantando, hable con olivo médico u obstetra sobre cualquier preocupación que tenga  · El virus puede permanecer en objetos y superficies   [address] puede contraer el virus en ambrocio canelo al tocar el objeto o la superficie  La infección se produce si la persona se toca los ojos o la boca sin antes lavarse las canelo  Aún no se sabe cuánto tiempo puede permanecer el virus en un objeto o superficie  Por eso es importante limpiar todas las superficies que se usan regularmente  · Un animal infectado puede ser Daenn Bellmore de infectar a alyssa persona que lo toque  [place] puede ocurrir en [facility] vivos o en alyssa heron  Cómo todo el brayden puede reducir el riesgo de COVID-19: La mejor manera de prevenir la infección es evitar a cualquiera que esté infectado, dawood esto puede ser difícil de lograr  Alyssa persona infectada puede propagar el virus antes de que aparezcan los signos o síntomas, o incluso si los signos o síntomas nunca se desarrollan  Lo siguiente puede ayudar a reducir el riesgo de infección:      · [employer] las canelo con frecuencia claudia el día  Utilice agua y Aron  Frótese las canelo enjabonadas, [facility] dedos  Lávese el frente y el dorso de cada [place], y [place] dedos  Use los dedos de alyssa mano para restregar debajo de las uñas de la [place]  Lávese claudia al menos 20 segundos  Enjuague con agua corriente caliente claudia varios segundos  Luego séquese las canelo con alyssa toalla limpia o alyssa toalla de papel  Puede usar un desinfectante para canelo que contenga alcohol, si no hay agua y jabón disponibles  No se toque los ojos, la nariz o la boca sin antes [employer]  Enseñe a los niños a lavarse las canelo y a usar el desinfectante de [address]  · Cúbrase al toser o estornudar  [place] liam que las gotitas viajen de usted a los demás  Gire la jeronimo y cúbrase la boca y la Melany Camilo con un pañuelo  Deseche el pañuelo  Use el ángulo del brazo si no tiene un pañuelo disponible  Luego lávese las canelo con agua y Aron o use un desinfectante de canelo  Gire la yu y cúbrase si está cerca de alguien que está estornudando o tosiendo   Enséñeles a los niños a cubrirse al toser o estornudar  · [address] distanciamiento social a nivel local, nacional y [place]  El distanciamiento social significa que las personas evitan el contacto físico cercano para que el virus no se propague de  Bold persona a otra  Mantenga al menos 6 pies (2 metros) de distancia entre usted y los demás  También mantenga esta distancia de cualquiera que venga a olivo casa, rand alguien que lavern alyssa entrega  · Acostúmbrese a no tocarse la jeronimo  No se sabe cuánto tiempo puede permanecer el virus en los objetos y las superficies  Si tiene el virus [employer], puede transferirlo a los ojos, la nariz o la boca e infectarse  También puede transferirlo a los objetos, las [address]  Tenga cuidado con lo que toca [employer]  Por ejemplo, los pasamanos y botones de ascensor  Intente no tocar nada con las canelo descubiertas a menos que sea necesario  [employer] las canelo antes de salir de olivo casa y cuando regresa  · Limpie y desinfecte a menudo los objetos y las superficies de alto contacto  Use alyssa solución o toallitas desinfectantes  Puede hacer alyssa solución diluyendo 4 cucharaditas de lejía en 1 cuarto de galón (4 tazas) de agua  Limpie y desinfecte aunque piense que nadie que viva o haya entrado en olivo casa esté infectado con el virus  Puede limpiar los objetos con un paño desinfectante antes de llevarlos a olivo casa  [employer] las canelo después de manipular cualquier cosa que traiga a olivo casa  · Lavern que olivo sistema inmunitario esté lo más saludable posible  Un sistema inmunitario debilitado lo hace más vulnerable al nuevo coronavirus  No hay ninguna vacuna contra la COVID-19 disponible todavía  Las vacunas, rand la vacuna contra la gripe y la neumonía, pueden ayudar al sistema inmunitario  Olivo médico le indicará qué vacunas debe recibir y cuándo aplicárselas  Mantenga olivo sistema inmunitario lo más jonathan posible  No fume   Consuma alimentos saludables, lavern ejercicio regularmente e intente controlar el estrés  Acuéstese y levántese a la misma hora todos los días  Pautas de distanciamiento social: Las normas de distanciamiento social nacionales y locales varían  Las reglas pueden cambiar con el tiempo a medida que se levantan las restricciones  Las restricciones pueden volver a aplicarse si se produce un brote en el lugar donde usted vive  Es importante conocer y seguir todas las reglas de distanciamiento social actuales en olivo Vicente Spindle  Las siguientes son reglas generales al respecto:  · Limite los viajes fuera de olivo casa  Es posible que se le entreguen alimentos, medicinas y otros suministros  Si es posible, reyes que dejen los [employer] entregan en olivo yan o en [place]  Intente que nadie le entregue un [employer]  Estará tan cerca de la persona que el virus puede propagarse entre ustedes  · No tenga contacto físico cercano con nadie que no viva en olivo casa  No le dé la mano, abrace o bese a alyssa persona rand saludo  Párese o camine lo más lejos posible de los demás  Si tiene que usar el transporte público ([address]), intente sentarse o pararse lejos de los demás  Puede mantenerse conectado de forma navas con los demás a través de llamadas telefónicas, mensajes de correo electrónico, sitios web de [employer] y videochats  Verifique cómo están las personas que pueden tener dificultades para distanciarse socialmente, o que viven solas  Pregunte a los administradores de los asilos de ancianos o de las instalaciones de cuidados a marie plazo cómo puede comunicarse con seguridad con alguien que vive allí  · Use un tapabocas de genet cuando esté cerca de otras personas que no viven en olivo casa  Los tapabocas ayudan evitar que el virus se propague a otras personas en las gotitas  Puede usar un tapabocas transparente si alguien necesita leer ambrocio labios  [place] es un tapabocas con un plástico sobre el área de la boca para que se puedan joanna los labios   No utilice tapabocas que tengan válvulas de respiración o respiraderos  El virus puede salir por la válvula o el respiradero y contagiar a otros  No se quite el tapabocas para hablar, toser o estornudar  No utilice tapabocas en niños menores de 2 años ni en personas que tengan problemas respiratorios o no puedan quitárselo  · Permita que solo los profesionales médicos u otros profesionales ingresen a olivo casa  Use el tapabocas y recuérdeles a los profesionales que usen un tapabocas  Recuérdeles que se laven las canelo cuando lleguen y antes de irse  No  deje entrar a nadie que no viva en olivo casa, aunque no esté enfermo  Alyssa persona puede contagiar el virus a otros antes de que comiencen los síntomas de COVID-19  Algunas personas ni siquiera desarrollan síntomas  Los niños suelen tener síntomas leves o ningún síntoma  Puede ser difícil decirle a un rossy que no lo abrace ni lo bese  Explíquele que así es rand puede ayudarlo a mantenerse saludable  · No vaya a la casa de [place] persona, a menos que sea necesario  No vaya de visita, aunque la persona esté erica  Vaya solo si necesita ayudarla  Asegúrese de que ambos usen un tapabocas mientras esté allí  · Evite las grandes reuniones y las multitudes  Las reuniones o multitudes de 10 o más individuos pueden hacer que el virus se propague  Por ejemplo, las reuniones incluyen fiestas, eventos deportivos, servicios religiosos y conferencias  Se pueden formar multitudes en [address], los parques y las atracciones turísticas  Protéjase manteniéndose alejado de las grandes reuniones y multitudes  · Pregunte a olivo médico de qué otra forma [address] citas  Es posible que pueda tener citas sin tener que ir al consultorio del médico  Algunos médicos ofrecen citas por teléfono, video u otros tipos de citas  También puede obtener recetas de ambrocio medicamentos para varios meses de alyssa vez  · Manténgase a tom si debe que salir a trabajar   Es posible que tenga un trabajo que solo se puede hacer fuera de olivo casa  Mantenga la distancia física entre usted y los demás trabajadores tanto rand sea posible  Siga las reglas de olivo empleador para que todos estén a tom  Si tiene COVID-19 y se está recuperando en casa: Los [employer] darán instrucciones específicas que debe seguir  Las siguientes son pautas generales para recordarle cómo mantener a los demás a tom hasta que usted esté antonio:  · Lávese las canelo frecuentemente  Use agua y jabón tanto rand sea posible  Puede usar un desinfectante para canelo que contenga alcohol, si no hay agua y jabón disponibles  No comparta toallas con nadie  Si [place] toallas de papel, deséchelas en un cubo de basura recubierto que se guarda en olivo habitación o área  Use un cubo de basura cubierto, si es posible  · No salga de olivo casa a menos que sea necesario  Es posible que tenga que ir al [facility] de olivo médico para hacerse chequeos o para resurtir alyssa Shani Pluck  No llegue al consultorio del médico sin alyssa selvin  Los médicos tienen que hacer que ambrocio consultorios chano seguros para el personal y [de-identified] pacientes  · No entre en contacto físico cercano con nadpoly, tom que sea necesario  Solo tenga un contacto físico cercano con alyssa persona que lo cuide directamente, o con un bebé o rossy que deba cuidar  Los miembros de la shaheed y los amigos no deben visitarlo  Si es posible, quédese en un área o habitación separada de olivo casa si vive con [employer]  Evan debe entrar en el área o en la habitación excepto para brindarle cuidados  Puede visitar a los demás por teléfono, videochat, correo electrónico o sistemas similares  Es importante mantenerse conectado con los demás en olivo obey mientras se recupera  · Use un tapabocas mientras haya otras personas cerca de usted  [place] puede ayudar a [employer] propaguen el virus cuando usted [place], estornuda o tose  Póngase el tapabocas antes de que la persona entre en olivo habitación o [facility]   Recuérdele a la persona que se cubra la nariz y la boca antes de entrar a brindarle cuidados  · No comparta artículos  No comparta platos, toallas ni otros artículos con nadie  Los artículos deben ser lavados después de usarlos  · [place] a olivo bebé  Lávese las canelo con agua y jabón con frecuencia claudia todo el día  Use un tapabocas mientras amamanta o mientras se extrae o se saca la [address]  Si es posible, pídale a alguien que esté antonio que cuide de olivo bebé  Puede poner la [address] en biberones para que la persona la use, si es necesario  Hable con olivo médico si tiene preguntas o inquietudes acerca de cómo cuidar o vincularse con olivo bebé  También le dirá cuándo debe traer a olivo bebé para los chequeos y las vacunas  También le dirá qué hacer si digna que olivo bebé está infectado con el nuevo virus  · No manipule animales vivos  Hasta que se sepa más, es mejor no tocar, jugar o manipular animales vivos  Algunos animales, incluyendo las [place], loya sido infectados con el nuevo coronavirus  No manipule ni cuide animales hasta que esté antonio  El cuidado incluye alimentar, acariciar y Jetty Vinayak a olivo mascota  No deje que olivo mascota lo lama o comparta olivo comida  Pídale a alguien que no esté infectado que cuide de [address], si es posible  Si debe cuidar a [employer], [place] un tapabocas  Lávese las canelo antes y después de cuidar a olivo mascota  · Siga las instrucciones de olivo médico para estar cerca de los demás después de recuperarse  Deberá esperar al menos 10 días después de la aparición de los síntomas  Entonces deberá pasar 24 horas sin fiebre sin recibir medicamentos para la fiebre, y sin otros síntomas  La pérdida del sentido del gusto o el olfato puede continuar claudia varios meses  Se considera que está antonio estar cerca de otros si oneyda es el único síntoma  No se sabe con certeza si alyssa persona recuperada puede transmitir el virus a otros, ni por cuánto tiempo   Olivo médico puede darle instrucciones, rand continuar con el distanciamiento social o usar un tapabocas cuando esté cerca de otras personas  Cómo cuidar de alguien que tiene COVID-19: Si la persona vive en otro hogar, coordine un tiempo para brindar cuidados  Recuerde llevar algunos pares de guantes desechables y un tapabocas  Gustavo Alen son las pautas generales para ayudarle a cuidar de forma navas a cualquier persona que tenga COVID-19:  · Lávese las canelo frecuentemente  Lávese antes y después de entrar en la casa, área o habitación de la persona  [address] papel en un cubo de basura recubierto que tenga alyssa tapa, si es posible  · No permita que otros se acerquen a la persona  Nadie debe ingresar a la casa de la persona a menos que sea necesario  De ser posible, la persona debe estar en un área o habitación separada si vive con [employer]  Mantenga la yan de la habitación cerrada a menos que necesite entrar o salir  Lavern que otras personas llamen, charlen por video o envíen un correo electrónico a la persona si se siente lo suficientemente antonio  La persona puede sentirse erica si se la mantiene separada claudia un marie período de [place]  La comunicación navas puede ayudar a esta persona a mantenerse en contacto con olivo shaheed y amigos  · Asegúrese de que la habitación de la persona tenga un buen flujo de [place]  Puede abrir la ventana si el clima lo permite  También se puede encender el aire acondicionado para ayudar a que el aire se [place]  · Comuníquese con la persona antes de entrar para brindarle cuidados  Asegúrese de que la persona use un tapabocas  Recuérdele que se lave las canelo con agua y Aron  Puede usar un desinfectante para canelo que contenga alcohol, si no hay agua y jabón disponibles  Colóquese el tapabocas antes de entrar al lugar a brindar cuidados  · Use guantes mientras tarik cuidados y limpia  Limpie los [employer] persona [place] a menudo   Limpie las encimeras, las superficies de cocción y los frentes y [facility] interior del microondas y el refrigerador  Limpie la ducha, el sanitario, el área alrededor del sanitario, el lavabo, el área alrededor del lavabo y los grifos  Junte la ropa sucia o la ropa de Dior Lerma y seque los artículos con el agua más caliente que permita la genet  Lave los platos y utensilios usados en [place] y [place] o en un lavavajillas  · Todo lo que deseche debe ir a un cubo de basura recubierto  Cuando necesite vaciar la basura, cierre el extremo abierto de la [place] y [place]  [place] ayuda a evitar que los artículos en los que está el virus se salgan de la basura  Quítese los guantes y deséchelos  Lávese las [place]  Acuda a la consulta de control con olivo médico según las indicaciones: Anote ambrocio preguntas para que se acuerde de hacerlas claudia ambrocio visitas  Para más información:  · Centers for Disease Control and Prevention  [address] , [address]  Phone: [phone]  Web Address: [place] [address] [place] [address] Information is for End User's use only and may not be sold, redistributed or otherwise used for commercial purposes  All illustrations and images included in CareNotes® are the copyrighted property of A D A M , [facility]  or [address] [place] [address] es sólo para uso en educación  Olivo intención no es darle un consejo médico sobre enfermedades o tratamientos  Colsulte con olivo Marilee Bingham farmacéutico antes de seguir cualquier régimen médico para saber si es seguro y efectivo para usted

## 2021-01-24 NOTE — QUICK NOTE
The interval history and physical exam below were inadvertently left out of my discharge summary on 1/24/21  They were performed on 1/24/21 at the bedside  INTERVAL HISTORY 1/24/21: No acute events overnight  Complaining of mild cough and fatigue, as well as mild SOB which is improving  Denies fevers, chills, chest pain, N/V, diarrhea, abdominal pain, or leg pain  PHYSICAL EXAM:  Constitutional: No acute distress, resting comfortably in bed  HEENT: MMM, normal conjunctivae  CV: RRR, normal S1 and S2, no murmurs, rubs, gallops  Respiratory: mildly decreased breath sounds b/l, no wheezes, rales, rhonchi  Abdominal: Soft, nondistended, nontender  No rebound or guarding  MSK: No edema b/l  Skin: Warm and dry  Neuro: AOx3, no focal deficits

## 2021-01-24 NOTE — DISCHARGE SUMMARY
INTERNAL MEDICINE RESIDENCY DISCHARGE SUMMARY     Rosario Christine Mcpherson   72 y o  female  MRN: [id number]  Room/Bed: /MS [address]   Encounter: [id number]    Principal Problem:    COVID-19  Active Problems:    Hyponatremia    Transaminitis      Transaminitis  Assessment & Plan  AST 74, ALT 79 on admission  Alk-phos 241  Albumin 3 3  Denies history of drug use  Plan:  -Hepatitis panel      Hyponatremia  Assessment & Plan  Patient with a sodium of 132 on admission  Suspect likely secondary to decreased PO intake  Plan:  -Mild fluid restriction  -Urine sodium pending    * COVID-19  Assessment & Plan  Symptoms started on 01/11/2021 with headaches, fatigue, chills, and overall feeling unwell  Progressed over the following days to include shortness of breath, dyspnea on exertion, nonproductive cough, diarrhea, nausea, sore throat, decreased PO intake, myalgias, lower back pain, anosmia, and ageusia  Recent exposure was someone she lives with  She was seen via telemedicine on 01/22/2021 for COVID concerns, and subsequently tested positive later that day  In the ED, she was afebrile and saturating above 95% on room air  Slight decrease of oxygen saturation with ambulation  Significant lab findings include increased ferritin note 1332, increased CRP at 90 5, increased D-dimer at 1 27  BMP, troponin, CK all negative      Plan:  -Patient does not meet criteria for any COVID pathway  -Received 1 dose of dexamethasone 6 mg in the ED  -Will give vitamin-C 1000 mg Q12H, vitamin-D 2000 units daily, and zinc 220 mg daily with multivitamin  -Will admit to observation  -Will need home O2 evaluation  -Likely discharge tomorrow if symptoms do not progress      [address]     Per Dr Emely Shin H&P, "Patient is a 35-year-old female with no reported past medical history that presents to the ED for progressive COVID-19 symptoms  Her symptoms started on 01/11/2021 with headaches, fatigue, chills, and overall feeling unwell  Her symptoms progressed over the following days to include shortness of breath, dyspnea on exertion, nonproductive cough, diarrhea, nausea, sore throat, decreased PO intake, myalgias, lower back pain, anosmia, and ageusia  Patient states that somebody she lives with has tested positive for COVID recently  She was seen via telemedicine on 01/22/2021 for COVID concerns, and subsequently tested positive later that day  In the ED, she was afebrile and saturating above 95% on room air  Significant lab findings include slight hyponatremia 132, increased AST at 74, increased ALT at 79, increased alk-phos of 241, decreased albumin at 3 3, increased ferritin note 1332, increased CRP at 90 5, increased D-dimer at 1 27  BMP, troponin, CK all negative  The patient desaturated while walking, so will be admitted for observation  The patient does not meet criteria for any COVID pathway  Will need home O2 eval, and likely discharge tomorrow if there are no progression of symptoms "    Patient's symptoms remained mild throughout her admission  She never required oxygen and was saturating in mid to high 90s on room air  She ambulated multiple times to the bathroom with nursing during admission and never desaturated  Given mild symptoms, only mildly elevated inflammatory markers (as expected in COVID), and no need for supplemental oxygen at rest or on ambulation, patient was medically cleared for discharge on 1/24/21  She was instructed to take her medications as prescribed  She did not require further anticoagulation as d-dimer was less than 2 5  She was instructed to prone at home when possible for best oxygenation  She was instructed to follow up with PCP via Telemedicine for follow up appointment within 3 days   She was instructed to quarantine at home, and can come out of quarantine when she wakes up on February 1st      DISCHARGE INFORMATION     PCP at Discharge: William Mireles PA-C    Admitting Provider: Brendon Law MD  Admission Date: 1/23/2021    Discharge Provider: Brendon Law MD  Discharge Date: 1/24/21    Discharge Disposition: Final discharge disposition not confirmed  Discharge Condition: stable  Discharge with Lines: no    Discharge Diet: regular diet  Activity Restrictions: none, once she comes out of quarantine on 2/1  Test Results Pending at Discharge: none    Discharge Diagnoses:  Principal Problem:    COVID-19  Active Problems:    Hyponatremia    Transaminitis  Resolved Problems:    * No resolved hospital problems  *      Consulting Providers:  None    Diagnostic & Therapeutic Procedures Performed:  No results found  Code Status: Level 1 - Full Code  Advance Directive & Living Will: <no information>  Power of :    POLST:      Medications:  Current Discharge Medication List        Current Discharge Medication List        Current Discharge Medication List      CONTINUE these medications which have NOT CHANGED    Details   benzonatate (TESSALON) 200 MG capsule Take 1 capsule (200 mg total) by mouth 3 (three) times a day as needed for cough  Qty: 30 capsule, Refills: 0    Associated Diagnoses: Cough             Allergies: Allergies   Allergen Reactions    Penicillins Anaphylaxis       FOLLOW-UP     PCP Outpatient Follow-up:  Follow up within 3 days     Consulting Providers Follow-up:  none required     Active Issues Requiring Follow-up:   COVID-19    Discharge Statement:   I spent 30 minutes minutes discharging the patient  This time was spent on the day of discharge  I had direct contact with the patient on the day of discharge  Additional documentation is required if more than 30 minutes were spent on discharge  Portions of the record may have been created with voice recognition software    Occasional wrong word or "sound a like" substitutions may have occurred due to the inherent limitations of voice recognition software    Read the chart carefully and recognize, using context, where substitutions have occurred     ==  Myra Munoz MD  [address]  Internal Medicine Resident PGY-1

## 2021-01-24 NOTE — ASSESSMENT & PLAN NOTE
Patient with a sodium of 132 on admission  Suspect likely secondary to decreased PO intake      Plan:  -Mild fluid restriction  -Urine sodium pending

## 2021-01-24 NOTE — NURSING NOTE
AVS reviewed with pt and   This writer and  spoke with the patient son   Pt will be picked up around 5pm

## 2021-01-24 NOTE — ASSESSMENT & PLAN NOTE
AST 74, ALT 79 on admission  Alk-phos 241  Albumin 3 3  Denies history of drug use      Plan:  -Hepatitis panel

## 2021-01-24 NOTE — PLAN OF CARE
Problem: PAIN - ADULT  Goal: Verbalizes/displays adequate comfort level or baseline comfort level  Description: Interventions:  - Encourage patient to monitor pain and request assistance  - Assess pain using appropriate pain scale  - Administer analgesics based on type and severity of pain and evaluate response  - Implement non-pharmacological measures as appropriate and evaluate response  - Consider cultural and social influences on pain and pain management  - Notify physician/advanced practitioner if interventions unsuccessful or patient reports new pain  Outcome: Progressing     Problem: INFECTION - ADULT  Goal: Absence or prevention of progression during hospitalization  Description: INTERVENTIONS:  - Assess and monitor for signs and symptoms of infection  - Monitor lab/diagnostic results  - Monitor all insertion sites, i e  indwelling lines, tubes, and drains  - Monitor endotracheal if appropriate and nasal secretions for changes in amount and color  - [place] appropriate cooling/warming therapies per order  - Administer medications as ordered  - Instruct and encourage patient and family to use good hand hygiene technique  - Identify and instruct in appropriate isolation precautions for identified infection/condition  Outcome: Progressing  Goal: Absence of fever/infection during neutropenic period  Description: INTERVENTIONS:  - Monitor WBC    Outcome: Progressing     Problem: SAFETY ADULT  Goal: Patient will remain free of falls  Description: INTERVENTIONS:  - Assess patient frequently for physical needs  -  Identify cognitive and physical deficits and behaviors that affect risk of falls    -  [place] fall precautions as indicated by assessment   - Educate patient/family on patient safety including physical limitations  - Instruct patient to call for assistance with activity based on assessment  - Modify environment to reduce risk of injury  - Consider OT/PT consult to assist with strengthening/mobility  Outcome: Progressing  Goal: Maintain or return to baseline ADL function  Description: INTERVENTIONS:  -  Assess patient's ability to carry out ADLs; assess patient's baseline for ADL function and identify physical deficits which impact ability to perform ADLs (bathing, care of mouth/teeth, toileting, grooming, dressing, etc )  - Assess/evaluate cause of self-care deficits   - Assess range of motion  - Assess patient's mobility; develop plan if impaired  - Assess patient's need for assistive devices and provide as appropriate  - Encourage maximum independence but intervene and supervise when necessary  - Involve family in performance of ADLs  - Assess for home care needs following discharge   - Consider OT consult to assist with ADL evaluation and planning for discharge  - Provide patient education as appropriate  Outcome: Progressing  Goal: Maintain or return mobility status to optimal level  Description: INTERVENTIONS:  - Assess patient's baseline mobility status (ambulation, transfers, stairs, etc )    - Identify cognitive and physical deficits and behaviors that affect mobility  - Identify mobility aids required to assist with transfers and/or ambulation (gait belt, sit-to-stand, lift, walker, cane, etc )  - [place] fall precautions as indicated by assessment  - Record patient progress and toleration of activity level on Mobility SBAR; progress patient to next Phase/Stage  - Instruct patient to call for assistance with activity based on assessment  - Consider rehabilitation consult to assist with strengthening/weightbearing, etc   Outcome: Progressing     Problem: DISCHARGE PLANNING  Goal: Discharge to home or other facility with appropriate resources  Description: INTERVENTIONS:  - Identify barriers to discharge w/patient and caregiver  - Arrange for needed discharge resources and transportation as appropriate  - Identify discharge learning needs (meds, wound care, etc )  - Arrange for interpretive services to assist at discharge as needed  - Refer to Case Management Department for coordinating discharge planning if the patient needs post-hospital services based on physician/advanced practitioner order or complex needs related to functional status, cognitive ability, or social support system  Outcome: Progressing     Problem: Knowledge Deficit  Goal: Patient/family/caregiver demonstrates understanding of disease process, treatment plan, medications, and discharge instructions  Description: Complete learning assessment and assess knowledge base    Interventions:  - Provide teaching at level of understanding  - Provide teaching via preferred learning methods  Outcome: Progressing     Problem: RESPIRATORY - ADULT  Goal: Achieves optimal ventilation and oxygenation  Description: INTERVENTIONS:  - Assess for changes in respiratory status  - Assess for changes in mentation and behavior  - Position to facilitate oxygenation and minimize respiratory effort  - Oxygen administered by appropriate delivery if ordered  - Initiate smoking cessation education as indicated  - Encourage broncho-pulmonary hygiene including cough, deep breathe, Incentive Spirometry  - Assess the need for suctioning and aspirate as needed  - Assess and instruct to report SOB or any respiratory difficulty  - Respiratory Therapy support as indicated  Outcome: Progressing

## 2021-01-26 ENCOUNTER — TELEPHONE (OUTPATIENT)
Dept: INTERNAL MEDICINE CLINIC | Facility: CLINIC | Age: 66
End: 2021-01-26

## 2021-01-26 ENCOUNTER — TELEMEDICINE (OUTPATIENT)
Dept: INTERNAL MEDICINE CLINIC | Facility: CLINIC | Age: 66
End: 2021-01-26

## 2021-01-26 VITALS
TEMPERATURE: 97 F | BODY MASS INDEX: 19.84 KG/M2 | OXYGEN SATURATION: 96 % | HEART RATE: 80 BPM | HEIGHT: 63 IN | WEIGHT: 112 LBS

## 2021-01-26 DIAGNOSIS — E87.1 HYPONATREMIA: ICD-10-CM

## 2021-01-26 DIAGNOSIS — R74.01 TRANSAMINITIS: ICD-10-CM

## 2021-01-26 DIAGNOSIS — J12.82 PNEUMONIA DUE TO COVID-19 VIRUS: ICD-10-CM

## 2021-01-26 DIAGNOSIS — U07.1 PNEUMONIA DUE TO COVID-19 VIRUS: ICD-10-CM

## 2021-01-26 DIAGNOSIS — U07.1 COVID-19: Primary | ICD-10-CM

## 2021-01-26 PROCEDURE — 99213 OFFICE O/P EST LOW 20 MIN: CPT | Performed by: PHYSICIAN ASSISTANT

## 2021-01-26 NOTE — TELEPHONE ENCOUNTER
Jovan/son called because he has additional questions for [place]  He was asking about medication being sent to the pharmacy for her back pain  As well as more questions about her lungs  We have no signed communication on file for him  He said he would call back in the morning with his mom

## 2021-01-26 NOTE — TELEPHONE ENCOUNTER
Patient had a virtual today per Dr "Return in about 2 days (around 1/28/2021) for Next scheduled follow up "    I called patient to schedule virtual appt I left a voicemail for a call back

## 2021-01-26 NOTE — PROGRESS NOTES
COVID-19 Virtual Visit     Assessment/Plan:    Problem List Items Addressed This Visit        Other    COVID-19 - Primary    RESOLVED: Hyponatremia    RESOLVED: Transaminitis      Other Visit Diagnoses     Pneumonia due to COVID-19 virus             Disposition:     I recommended continued isolation until at least 24 hours have passed since recovery defined as resolution of fever without the use of fever-reducing medications AND improvement in COVID symptoms AND 10 days have passed since onset of symptoms (or 10 days have passed since date of first positive viral diagnostic test for asymptomatic patients)  Patient is a 22-year-old female presenting today for virtual visit with use of telephone  for hospital follow-up with admission for 24 hours from 01/23 to 1/24 for reported worsening COVID symptoms  Positive COVID test on 01/22 but reported symptoms since approximately 1/11 though unclear if related to COVID symptoms at that time  Patient seemed to do well throughout her 24 hour stay with no progression of symptoms and did not require any significant supplemental oxygen and reportedly was able to ambulate without any issues  Her oxygen on room air remained between the mid to high 90s  Today she reports that her symptoms overall are slightly improved though she does have multiple different symptoms, most concerning to her is her low back pain  She is requesting medication for this and I have advised OTC Tylenol or Motrin as needed as well as heating pad and gentle stretches to the muscles as tolerated  I did offer an albuterol rescue inhaler for her breathing however she declines and she does not seem to need any further respiratory intervention as she is seeming to move and talk fine without any obvious respiratory distress, audible wheezing or gasping for air while talking      We have advised continued isolation through February 1st as per recommendations from hospital  Patient does not seem to be completely isolating with family members in the household that she should so I did define what isolation means to both patient and her son who is helping to care for her as well as his wife  Son has tested positive for COVID in December however wife and kids have not had the infection  I did explain complete isolation is recommended in the household and any common areas that are used amongst patient and family all should be wearing a mask in that area and area should be seen a ties frequently throughout the day  They expressed understanding of this  At this time since patient is stable and reporting some slight improvement of symptoms, will continue to monitor her at this time  I have recommended a follow-up again in about 48-72 hours virtually to ensure that she is continuing to improve  Patient is not a candidate for monoclonal antibody treatment as this is greater than 7 days from onset of her reported symptoms  I have spent 30 minutes directly with the patient  Greater than 50% of this time was spent in counseling/coordination of care regarding: diagnostic results, prognosis, risks and benefits of treatment options, instructions for management, patient and family education, importance of treatment compliance and impressions  Encounter provider Hanh Fan PA-C    Provider located at [place] [id number] [phone]    Recent Visits  Date Type Provider Dept   01/22/21 [address] [place] [address] recent visits within past 7 days and meeting all other requirements     Today's Visits  Date Type Provider Dept   01/26/21 Telemedicine Hanh Fan, [address] [place] [address] today's visits and meeting all other requirements     Future Appointments  No visits were found meeting these conditions     Showing future appointments within next 150 days and meeting all other requirements      This virtual check-in was done via [employer] and patient was informed that this is a secure, HIPAA-compliant platform  She agrees to proceed  Patient agrees to participate in a virtual check in via telephone or video visit instead of presenting to the office to address urgent/immediate medical needs  Patient is aware this is a billable service  After connecting through [facility], the patient was identified by name and date of birth  Angelina Moss was informed that this was a telemedicine visit and that the exam was being conducted confidentially over secure lines  My office door was closed  No one else was in the room  The patient was notified the following individuals were present in the room: [facility]  USED FOR TODAYS VISIT  Guero Goodwin acknowledged consent and understanding of privacy and security of the telemedicine visit  I informed the patient that I have reviewed her record in Epic and presented the opportunity for her to ask any questions regarding the visit today  The patient agreed to participate  Subjective:   Angelina Moss is a 72 y o  female who has been screened for COVID-19  Symptom change since last report: improving  Patient's symptoms include chills, fatigue, nasal congestion, anosmia, loss of taste, cough (dry), shortness of breath, chest tightness, nausea, diarrhea (slight), myalgias (mainly back) and headache  Patient denies fever, rhinorrhea, sore throat, abdominal pain and vomiting           Staying home and isolating themselves?: has not  She is taking care to not share personal items and     Cleaning all surfaces that are touched often?: is not      Wearing a mask when leaving room?: is not      Date of symptom onset: 1/11/2021  Date of positive COVID-19 PCR: 1/22/2021    Hospital admission 1/23-1/24 for progressive covid 19 sxs, positive test 1/22     lab findings include increased ferritin note 1332, increased CRP at 90 5, increased D-dimer at 1 27, mild elevated liver enzymes in 70's  BMP, troponin, CK all negative  Scattered groundglass opacities concerning for multifocal Covid 19 pneumonia  Since hospital discharge on 1/24, in addition to above, she states loss of appetite and is tried but not able to sleep  States "the air doesn't get where it is supposed to"  She is asking for something for back pain - she is asking for something for pain  She does not feel she needs an inhaler       Lab Results   Component Value Date    SARSCOV2 Positive (A) 01/22/2021     History reviewed  No pertinent past medical history  History reviewed  No pertinent surgical history  Current Outpatient Medications   Medication Sig Dispense Refill    ascorbic acid (VITAMIN C) 1000 MG tablet Take 1 tablet (1,000 mg total) by mouth every 12 (twelve) hours for 6 days 12 tablet 0    benzonatate (TESSALON) 200 MG capsule Take 1 capsule (200 mg total) by mouth 3 (three) times a day as needed for cough 30 capsule 0    cholecalciferol (VITAMIN D3) 1,000 units tablet Take 2 tablets (2,000 Units total) by mouth daily for 6 days 12 tablet 0    zinc sulfate (ZINCATE) 220 mg capsule Take 1 capsule (220 mg total) by mouth daily for 6 doses 6 capsule 0     No current facility-administered medications for this visit  Allergies   Allergen Reactions    Penicillins Anaphylaxis       Review of Systems   Constitutional: Positive for chills and fatigue  Negative for fever  HENT: Positive for congestion  Negative for rhinorrhea and sore throat  Respiratory: Positive for cough (dry), chest tightness and shortness of breath  Gastrointestinal: Positive for diarrhea (slight) and nausea  Negative for abdominal pain and vomiting  Musculoskeletal: Positive for myalgias (mainly back)  Neurological: Positive for headaches       Objective:    Vitals:    01/26/21 1137   Pulse: 80   Temp: (!) 97 °F (36 1 °C)   TempSrc: Oral   SpO2: 96%   Weight: 50 8 kg (112 lb)   Height: 5' 3" (1 6 m)       Physical Exam  Constitutional:       General: She is not in acute distress  Appearance: She is not ill-appearing or toxic-appearing  HENT:      Head: Normocephalic and atraumatic  Pulmonary:      Effort: Pulmonary effort is normal  No respiratory distress  Neurological:      Mental Status: She is alert and oriented to person, place, and time  Psychiatric:         Mood and Affect: Mood normal          Behavior: Behavior normal        VIRTUAL VISIT DISCLAIMER    Rosario Gaviriaisauro Ugalde acknowledges that she has consented to an online visit or consultation  She understands that the online visit is based solely on information provided by her, and that, in the absence of a face-to-face physical evaluation by the physician, the diagnosis she receives is both limited and provisional in terms of accuracy and completeness  This is not intended to replace a full medical face-to-face evaluation by the physician  Yonathan Frank understands and accepts these terms

## 2021-01-27 LAB — IL6 SERPL-MCNC: 33 PG/ML (ref 0–13)

## 2021-01-27 NOTE — ED ATTENDING ATTESTATION
1/23/2021  IAnni MD, saw and evaluated the patient  I have discussed the patient with the resident/non-physician practitioner and agree with the resident's/non-physician practitioner's findings, Plan of Care, and MDM as documented in the resident's/non-physician practitioner's note, except where noted  All available labs and Radiology studies were reviewed  I was present for key portions of any procedure(s) performed by the resident/non-physician practitioner and I was immediately available to provide assistance  At this point I agree with the current assessment done in the Emergency Department  I have conducted an independent evaluation of this patient a history and physical is as follows:    ED Course    patient is a 70-year-old lady recently tested positive for COVID presents with hypoxia diarrhea shortness of breath  Patient is of ill-appearing fatigued heart regular rate rhythm without murmurs lungs bilateral rales  Abdomen soft nontender nondistended  Impression viral syndrome COVID-19 positive patient desats on ambulatory pulse ox test 88%    Will admit for mild pathway COVID-19      Critical Care Time  Procedures

## 2021-02-01 ENCOUNTER — TELEMEDICINE (OUTPATIENT)
Dept: INTERNAL MEDICINE CLINIC | Facility: CLINIC | Age: 66
End: 2021-02-01

## 2021-02-01 ENCOUNTER — TELEPHONE (OUTPATIENT)
Dept: INTERNAL MEDICINE CLINIC | Facility: CLINIC | Age: 66
End: 2021-02-01

## 2021-02-01 DIAGNOSIS — U07.1 COVID-19: Primary | ICD-10-CM

## 2021-02-01 PROCEDURE — 99212 OFFICE O/P EST SF 10 MIN: CPT | Performed by: PHYSICIAN ASSISTANT

## 2021-02-01 NOTE — PROGRESS NOTES
COVID-19 Virtual Visit     Assessment/Plan:    Problem List Items Addressed This Visit        Other    RESOLVED: COVID-19 - Primary         Disposition:     77Y/O y/o female here today for telephone encounter for 2nd covid f/u  She reports her sxs have pretty much resolved aside from some residual LBP  At this time, today is her last day of isolation  We again discussed potential for short term antibody protection, and emphasized importance of continued mask wearing when out in public, germ precautions/frequent hand washing, and social distancing as advised by CDC  Discussed potential for re-infection of not careful, and most immunity/protection lasts a few weeks to 2-3 months  Pt expresses understanding  Pt sounds comfortable on the phone and no obvious respiratory abnormalities appreciated  No further treatment needed at this time  I have only seen pt x 2 for covid f/u via telemed  I have advised consideration for a visit in the near future to establish in office for preventative physical  Pt agrees and message sent to clerical staff to call and arrange this visit  She is not aware of any pre-existing medical conditions  Most likely would benefit from BW, and also will need to discuss HM vaccines, mammo, colonoscopy, cervical CA screening, PHQ, HIV/Hep C screen  Questions were answered for patient and her son to their satisfaction  I have spent 15 minutes directly with the patient  Greater than 50% of this time was spent in counseling/coordination of care regarding: prognosis, instructions for management, patient and family education, risk factor reductions and impressions          Encounter provider Rosalina Cano PA-C    Provider located at [address] [place] [id number] [phone]    Recent Visits  Date Type Provider Dept   01/26/21 Telephone Rosalina Cano, [address] [facility]   01/26/21 Telephone Viktoriya Westfall Lebron Bailey, [address] [facility]   01/26/21 Telemedicine Brandon Gonzalez, [address] [facility] recent visits within past 7 days and meeting all other requirements     Today's Visits  Date Type Provider Dept   02/01/21 Telephone Brandon Gonzalez, [address] [facility]   02/01/21 [address] [facility], [facility] today's visits and meeting all other requirements     Future Appointments  No visits were found meeting these conditions  Showing future appointments within next 150 days and meeting all other requirements        Patient agrees to participate in a virtual check in via telephone or video visit instead of presenting to the office to address urgent/immediate medical needs  Patient is aware this is a billable service  After connecting through Telephone, the patient was identified by name and date of birth  Paloma Rodriguez was informed that this was a telemedicine visit and that the exam was being conducted confidentially over secure lines  My office door was closed  The patient was notified the following individuals were present in the room: [facility] telephone  used for todays visit  Dixie Jobs de [place] Lefort acknowledged consent and understanding of privacy and security of the telemedicine visit  I informed the patient that I have reviewed her record in Epic and presented the opportunity for her to ask any questions regarding the visit today  The patient agreed to participate  It was my intent to perform this visit via video technology but the patient was not able to do a video connection so the visit was completed via audio telephone only  Subjective:   Paloma Rodriguez is a 72 y o  female who has been screened for COVID-19  Symptom change since last report: resolving  Patient's symptoms include myalgias (slight back discomfort otherwise resolution of sxs)   Patient denies fever, chills, congestion, rhinorrhea, sore throat, anosmia, loss of taste, cough (resolved), shortness of breath, chest tightness, abdominal pain, nausea, vomiting, diarrhea and headaches  Norberto Sacks has been staying home and has isolated themselves in her home  She is taking care to not share personal items and is cleaning all surfaces that are touched often, like counters, tabletops, and doorknobs using household cleaning sprays or wipes  She is wearing a mask when she leaves her room  Date of symptom onset: 1/11/2021  Date of positive COVID-19 PCR: 1/22/2021    Last day advised for isolation today, 2/1/21  She has no concerns today and no sxs  Lab Results   Component Value Date    SARSCOV2 Positive (A) 01/22/2021     History reviewed  No pertinent past medical history  History reviewed  No pertinent surgical history  Current Outpatient Medications   Medication Sig Dispense Refill    ascorbic acid (VITAMIN C) 1000 MG tablet Take 1 tablet (1,000 mg total) by mouth every 12 (twelve) hours for 6 days 12 tablet 0    benzonatate (TESSALON) 200 MG capsule Take 1 capsule (200 mg total) by mouth 3 (three) times a day as needed for cough 30 capsule 0    cholecalciferol (VITAMIN D3) 1,000 units tablet Take 2 tablets (2,000 Units total) by mouth daily for 6 days 12 tablet 0    zinc sulfate (ZINCATE) 220 mg capsule Take 1 capsule (220 mg total) by mouth daily for 6 doses 6 capsule 0     No current facility-administered medications for this visit  Allergies   Allergen Reactions    Penicillins Anaphylaxis       Review of Systems   Constitutional: Negative for chills and fever  HENT: Negative for congestion, rhinorrhea and sore throat  Respiratory: Negative for cough (resolved), chest tightness and shortness of breath  Gastrointestinal: Negative for abdominal pain, diarrhea, nausea and vomiting  Musculoskeletal: Positive for myalgias (slight back discomfort otherwise resolution of sxs)     Neurological: Negative for headaches  Objective: There were no vitals filed for this visit  Physical Exam     UNABLE TO PERFORM PHYSICAL EXAM TODAY AS AUDIO NOT WORKING WITH VIDEO, VIDEO SWITCHED TO TELEPHONE  PT TALKING ON TELEPHONE AND SOUNDS HEALTHY, COMFORTABLE AND IN NO DISTRESS  VIRTUAL VISIT DISCLAIMER    Rosario Ospina Caroline acknowledges that she has consented to an online visit or consultation  She understands that the online visit is based solely on information provided by her, and that, in the absence of a face-to-face physical evaluation by the physician, the diagnosis she receives is both limited and provisional in terms of accuracy and completeness  This is not intended to replace a full medical face-to-face evaluation by the physician  Pola Frank understands and accepts these terms

## 2021-02-01 NOTE — TELEPHONE ENCOUNTER
STAFF, PLEASE REACH OUT TO PT TO COORDINATE AN OFFICE VISIT TO ESTABLISH HERE IN OFFICE FOR ANNUAL PREVENTATIVE PHYSICAL  I HAVE ONLY SEEN HER FOR COVID VIA TELEMED APPTS AND ADVISED SHE COME INTO OFFICE WITHIN THE NEXT 1-2 MONTHS SO WE CAN SEE HER AD ESTABLISH HERE FOR PHYSICAL  THANK YOU

## 2021-02-19 RX ORDER — UREA 10 %
LOTION (ML) TOPICAL
COMMUNITY
Start: 2021-01-24

## 2021-02-24 ENCOUNTER — OFFICE VISIT (OUTPATIENT)
Dept: INTERNAL MEDICINE CLINIC | Facility: CLINIC | Age: 66
End: 2021-02-24

## 2021-02-24 VITALS
DIASTOLIC BLOOD PRESSURE: 73 MMHG | SYSTOLIC BLOOD PRESSURE: 135 MMHG | TEMPERATURE: 97.1 F | BODY MASS INDEX: 20.55 KG/M2 | HEART RATE: 95 BPM | WEIGHT: 116 LBS

## 2021-02-24 DIAGNOSIS — U07.1 PNEUMONIA DUE TO COVID-19 VIRUS: ICD-10-CM

## 2021-02-24 DIAGNOSIS — Z12.11 SCREENING FOR COLORECTAL CANCER: ICD-10-CM

## 2021-02-24 DIAGNOSIS — M25.561 ACUTE PAIN OF RIGHT KNEE: ICD-10-CM

## 2021-02-24 DIAGNOSIS — R05.9 COUGH: Primary | ICD-10-CM

## 2021-02-24 DIAGNOSIS — Z12.12 SCREENING FOR COLORECTAL CANCER: ICD-10-CM

## 2021-02-24 DIAGNOSIS — Z12.31 BREAST CANCER SCREENING BY MAMMOGRAM: ICD-10-CM

## 2021-02-24 DIAGNOSIS — Z00.00 HEALTH CARE MAINTENANCE: ICD-10-CM

## 2021-02-24 DIAGNOSIS — J12.82 PNEUMONIA DUE TO COVID-19 VIRUS: ICD-10-CM

## 2021-02-24 PROCEDURE — 99214 OFFICE O/P EST MOD 30 MIN: CPT | Performed by: INTERNAL MEDICINE

## 2021-02-24 RX ORDER — IBUPROFEN 400 MG/1
400 TABLET ORAL EVERY 8 HOURS PRN
Qty: 30 TABLET | Refills: 1 | Status: SHIPPED | OUTPATIENT
Start: 2021-02-24

## 2021-02-24 RX ORDER — GUAIFENESIN/DEXTROMETHORPHAN 100-10MG/5
5 SYRUP ORAL
Qty: 236 ML | Refills: 1 | Status: SHIPPED | OUTPATIENT
Start: 2021-02-24

## 2021-02-24 NOTE — PATIENT INSTRUCTIONS
Cáncer de mama en mujeres   CUIDADO AMBULATORIO:   El cáncer de mama comienza en el tejido o en los conductos de la mama  Las células cancerosas de la mama podrían propagarse a otras partes del cuerpo, rand al hígado, al pulmón y al cerebro  Los signos y síntomas más comunes incluyen los siguientes:  · Rob Ajit o alyssa protuberancia en la mama, o nódulos linfáticos hinchados bajo el brazo    · Sangrado o alyssa secreción transparente que sale del pezón    · Dolor en las mamas o sensación de dolor o pesadez    · Piel de la mama con hoyuelos (rand la piel de naranja), o alyssa silvia komal repentina en la piel    · El pezón se ve rand si lo hubieran empujado hacia adentro (pezón invertido)    Llame al Jayess Martinez de emergencias local (911 en los [place]) en cualquiera de los siguientes casos:  · Usted tiene dolor en el pecho  · Usted repentinamente se siente mareado o con falta de aliento  Llame a olivo médico o farmacéutico si:  · Tiene fiebre  · Tiene un dolor nuevo o diferente  · Usted tiene vómitos y no puede retener líquidos ni alimentos en el estómago  · Usted está deprimido o siente que no puede hacer frente a olivo enfermedad  · Usted tiene preguntas o inquietudes acerca de olivo condición o cuidado  El tratamiento del cáncer de mama depende del tamaño del tumor, si se ha propagado y si responde a las hormonas  El tratamiento también depende de olivo edad y del tipo de cáncer que tenga  Algunos tipos se desarrollan más lentamente que otros  Olivo médico hablará con usted acerca de los beneficios y riesgos de los tratamientos que pueden ser adecuados para usted  La ayudará a decidir el tratamiento que mejor se adapte a ambrocio necesidades y objetivos  · Los medicamentos con hormonas se podría usar si el cáncer es susceptible a las hormonas  · La radioterapia [place] destellos de prosper x de balaji energía para eliminar las células cancerosas      · Los medicamentos de la quimioterapia se usan para [address] [place] [address] cancerosas  Usted podría recibir un medicamento o alyssa combinación de medicamentos  · La terapia dirigida es un medicamento que encuentra marcadores en algunas células cancerosas y [address] células  · La cirugía Se puede usar para extirpar el tumor  Raymondo Manuel cirugía llamada mastectomía también podría ser necesaria para extraer todo o parte de la mama  Podría realizarse alyssa mastectomía para tratar el cáncer de mama o evitar que el cáncer se extienda  Exploración mamaria incluye los siguientes:  · Alyssa autoexploración mamaria significa que usted revisa ambrocio mamas para detectar cualquier protuberancia u otros cambios  Si tiene la menstruación, examine las [employer] de que termine el periodo  Comuníquese con olivo oncólogo si usted nota cualquier cambio en ambrocio mamas  Pregunte por más información acerca de cómo autoexaminarse las [place]  · Mamografías podría recomendarse  Pregunte si necesita Eppie Pica y con qué frecuencia  Si usted tiene la menstruación, programe la [employer] las primeras 2 semanas después del periodo  Controle olivo cáncer de mama:  · Hable con olivo médico sobre la planificación familiar  Algunos tipos de tratamiento, rand la quimioterapia, pueden afectar olivo capacidad para tener un bebé  Olivo médico hablará con usted sobre las opciones si Lindalee Fallow  · Mantenga un peso saludable  El peso corporal adicional aumenta el riesgo de que se produzca un cáncer de mama nuevo o que vuelva a aparecer  Pregúntele a olivo médico cuál es el peso ideal para usted  Puede ayudarla a crear un plan para perder peso de manera navas, si lo necesita  · Consuma alimentos saludables y variados  Los alimentos saludables incluyen frutas, verduras, pan integral, productos lácteos bajos en grasa, frijoles, randi magras y pescado  Pregunte si necesita seguir alyssa dieta especial     · [place] líquidos rand se le haya indicado   Pregunte cuánto líquido debe geo cada día y cuáles líquidos son los más adecuados para usted  [place] más líquidos para evitar la deshidratación  Usted también tendrá que reemplazar líquidos si vomita o tiene diarrea debido a los tratamientos del cáncer  · Ejercítese según indicaciones  Pregunte a olivo oncólogo acerca del mejor plan de ejercicios para usted  El ejercicio puede disminuir los efectos secundarios del tratamiento, rand las Jesus Manuel, los vómitos y la debilidad  También le puede ayudar a mejorar olivo estado de ánimo  Suspenda el ejercicio si siente dolor en el pecho, tiene dificultad para respirar o se siente mareada  No reyes ejercicio si tiene fiebre o si en las últimas 24 horas le loya administrado medicamento para el cáncer por vía intravenosa  · No fume  La nicotina puede dañar los vasos sanguíneos y dificultar el control del cáncer Lance  El fumar aumenta olivo riesgo de presentar un nuevo cáncer o que el cáncer reaparezca y demorar el tiempo de recuperación después del [place]  No use cigarrillos electrónicos o tabaco sin humo en vez de cigarrillos o para tratar de dejar de fumar  Todos estos aún contienen nicotina  Pida a olivo médico información si usted fuma actualmente y [place] para dejar de hacerlo  · Limite o no consuma bebidas alcohólicas, según indicaciones  Olivo oncólogo podría pedirle que limite el consumo de alcohol o que no lo consuma  El alcohol aumenta el riesgo de que se produzca un cáncer de mama nuevo o que vuelva a aparecer  Si juan gross, limite el alcohol a 1 bebida por día  Un trago equivale a 12 onzas de cerveza, 5 onzas de vino o 1 onza y ½ de licor  Programe alyssa selvin de seguimiento con olivo urólogo o con olivo oncólogo según indicaciones: Usted necesitará acudir al oncólogo para recibir tratamiento continuo y para citas de control  Anote ambrocio preguntas para que se acuerde de hacerlas claudia ambrocio visitas    © [address] Information is for End User's use only and may not be sold, redistributed or otherwise used for commercial purposes  All illustrations and images included in CareNotes® are the copyrighted property of A D A M , Inc  [facility] es sólo para uso en educación  Olivo intención no es darle un consejo médico sobre enfermedades o tratamientos  Colsulte con olivo [facility] farmacéutico antes de seguir cualquier régimen médico para saber si es seguro y efectivo para usted  Detección de cáncer colorrectal   CUIDADO AMBULATORIO:   La detección de cáncer colorrectal sirve para encontrar problemas a tiempo y comenzar el tratamiento  El cáncer colorrectal es la tercera causa principal de muerte en los [place] y [place]  El tratamiento temprano puede salvarle la obey  Se recomienda que se reyes la [employer] 48 y 76 años de edad  Olivo médico le dirá si debe hacerse la prueba de detección antes de los 48 años o después de los 76 años de [place]  Tipos de prueba de detección disponibles: Olivo médico hablará con usted sobre los beneficios y riesgos de cada tipo de prueba de detección: Trabajará con usted para decidir qué prueba de detección es mejor para usted  También le dirá cuándo debe empezar a realizarse la prueba de detección  · El examen de nick oculta en heces (FOBT) puede recolectarse en casa  El examen de nick oculta en heces busca la presencia de nick en ambrocio evacuaciones intestinales  Necesitará alyssa pequeña muestra de 2 a 3 evacuaciones intestinales  Olivo médico le dará tarjetas especiales para colocar la muestra  Usted devolverá las tarjetas al consultorio de olivo médico o a un laboratorio para que le rasheed la prueba  Si olivo prueba es positiva para la presencia de [place], usted necesitará hacerse alyssa colonoscopia  Alyssa colonoscopia o sigmoidoscopia flexible puede ayudar a encontrar de dónde proviene [place] [employer]  La nick puede significar que juwan tiene pólipos, cáncer u otras afecciones, rand hemorroides   Es posible que tenga que evitar ciertos medicamentos y alimentos claudia aproximadamente 3 días antes de geo la [place]  Olivo médico le dirá qué medicamentos y alimentos debe evitar  Si no se encuentra nick, es posible que tenga que hacerse la prueba el próximo [place]  · Alyssa prueba inmunoquímica fecal (FIT) también se recoge en casa y luego se envía a un laboratorio para olivo análisis  La prueba inmunoquímica fecal también examina la presencia de nick en las evacuaciones intestinales  Usted no tiene que evitar ningún medicamento o alimento para hacerse la prueba inmunoquímica fecal  Usted recoge Marci Neil de alyssa evacuación intestinal y la coloca en un recipiente especial  El recipiente se envía por correo o se lleva al consultorio o laboratorio de olivo médico  Si se encuentra nick, es posible que necesite alyssa colonoscopia o alyssa sigmoidoscopia flexible  Si no se encuentra nick, es posible que tenga que hacerse la prueba el próximo [place]  · Alyssa sigmoidoscopia es un procedimiento para joanna dentro del recto y el colon sigmoide  El colon sigmoide es la parte inferior de los intestinos, la más cercana al recto  Se le insertará un sigmoidoscopio en el recto  Bekah es un tubo con Maudry Hendricks christine y alyssa Sherleen Salvage en el extremo  Imágenes del colon aparecen en un monitor claudia el procedimiento  Alyssa sigmoidoscopia flexible podría ayudar a diagnosticar cáncer de colon, inflamación, pólipos (crecimientos) o infecciones  · Alyssa colonoscopia es un procedimiento para examinar con un endoscopio el interior de olivo colon (intestino)  La sonda es un tubo flexible con alsysa christine pequeña y Grier Burkitt en la punta  [place] alyssa colonoscopia, es posible que le retiren pólipos o crecimientos de tejidos para analizarlos y detectar la presencia de cáncer  · [place] colonoscopia virtual es un tipo de examen que utiliza radiografías para examinar el interior del colon (intestino grueso)   Los médicos utilizan alyssa tomografía computarizada o imágenes por resonancia magnética para geo imágenes del colon desde fuera de olivo cuerpo  Necesitará ingerir líquido de contraste para que las imágenes se aprecien mejor  Bekah procedimiento se [place] para determinar si tiene pólipos (crecimientos) o cáncer  También puede controlar el tamaño de los pólipos  Es posible que necesite bekah procedimiento para comprobar si el cáncer colorrectal ha vuelto a aparecer después del tratamiento  Se puede utilizar alyssa colonoscopia virtual si no es capaz de someterse alyssa colonoscopia regular  · El ADN en olivo evacuación intestinal puede analizarse para detectar cambios genéticos  Los cambios genéticos pueden ser un signo de cáncer colorrectal     Con qué frecuencia puede necesitar pruebas de detección de cáncer colorrectal: Olivo médico le enseñará con qué frecuencia debe hacerse la prueba de detección  El momento depende del tipo de detección y si se encontraron pólipos u otros problemas  El momento también depende de olivo edad y de si usted tiene mayor riesgo de tener cáncer  Es posible que necesite hacerse la prueba cada 1, 2, 5 o 10 años  Riesgos de la detección del cáncer colorrectal: Siempre hay riesgos con cualquier tipo de detección  Hable con olivo médico sobre los riesgos de la detección  Es posible que [employer] indique lo siguiente:  · Puede ocurrir un resultado falso-negativo  El resultado puede retrasar el tratamiento porque muestra que no se encontró cáncer  Puede hacer que usted no reciba tratamiento incluso cuando tenga síntomas  · Puede ocurrir un resultado falso positivo  Bekah resultado puede hacer que le rasheed más pruebas  También puede hacer que se sienta ansioso si digna que tiene cáncer  © [address] Information is for End User's use only and may not be sold, redistributed or otherwise used for commercial purposes  All illustrations and images included in CareNotes® are the copyrighted property of Yemi VELASQUEZ  or [address] [place] [address] es sólo para uso en educación   Olivo intención no es darle un consejo médico sobre enfermedades o tratamientos  Colsulte con olivo [place] farmacéutico antes de seguir cualquier régimen médico para saber si es seguro y efectivo para usted

## 2021-02-24 NOTE — PROGRESS NOTES
INTERNAL MEDICINE OFFICE VISIT  [facility]  [address] [address] [facility], [place] [address]    NAME: Jana Díaz  AGE: 72 y o  SEX: female    DATE OF ENCOUNTER: 2/24/2021    Assessment and Plan     1  Cough  Intermittent cough worse at night; Hx of COVID in 01/2021   - dextromethorphan-guaiFENesin (ROBITUSSIN DM)  mg/5 mL syrup; Take 5 mL by mouth daily at bedtime  Dispense: 236 mL; Refill: 1    2  Pneumonia due to COVID-19 virus  Mild, Did not require O2 supplementation, all symptoms resolved except for Intermittent Dry Cough   Tested (+) 01/22, D-Dimer was 1 27, Ferritin elevated, AST 74 and ALT 79, ALP was 241   - dextromethorphan-guaiFENesin (ROBITUSSIN DM)  mg/5 mL syrup; Take 5 mL by mouth daily at bedtime  Dispense: 236 mL; Refill: 1  - Comprehensive metabolic panel; Future  - Iron Panel (Includes Ferritin, Iron Sat%, Iron, and TIBC); Future    3  Acute pain of right knee  See HPI   - ibuprofen (MOTRIN) 400 mg tablet; Take 1 tablet (400 mg total) by mouth every 8 (eight) hours as needed for mild pain  Dispense: 30 tablet; Refill: 1  - Diclofenac Sodium (VOLTAREN) 1 %; Apply 2 g topically 3 (three) times a day as needed (right knee pain)  Dispense: 150 g; Refill: 2    4  Breast cancer screening by mammogram  - Mammo screening bilateral w 3d & cad; Future    5  Screening for colorectal cancer  - Ambulatory referral to Gastroenterology; Future    6   Health care maintenance  Patient refused TDAP, Pneumococcal and FLU vaccines as "think she might have got them already in [place] recently"   Patient may qualify for COVID Vaccine after 90 days of her recent COVID infection      Orders Placed This Encounter   Procedures    Mammo screening bilateral w 3d & cad    Comprehensive metabolic panel    Ambulatory referral to Gastroenterology       - Counseling Documentation: patient was counseled regarding: instructions for management    Chief Complaint     Chief Complaint   Patient presents with    Physical Exam     New Patient       History of Present Illness     HPI  Patient is a 71 yo female, no known PMH except for recent COVID infection (+) January 2021, who presents today to f/u on COVID as well as with mild acute Right knee pain  Patient notes her COVID "resolved" only still experiencing some intermittent dry cough worse at bed time; denies fever, dysphagia, epigastric pain, N/V/D, taste/smell changes, SOB or PALMER  Patient complains of mild 5 on scale 1-10 right knee pain started 2-3 weeks ago, no trauma hx, no clicking, does not give up, more on the medial side, has not try any meds  Patient refused FLU, PNEUM, TDAP shots - see A&P - accepted Mammogram and CRC screening, noting she will schedule them to be done after her upcoming trip/visit to [place]  The following portions of the patient's history were reviewed and updated as appropriate: allergies, current medications, past family history, past medical history, past social history, past surgical history and problem list     Review of Systems     Review of Systems  - GENERAL: Negative for any nausea, vomiting, fevers, chills, or weight loss  - HEENT: Negative for any head/Neck trauma, pain, double/blurry vision, sinusitis, rhinitis, nose bleeding   - CARDIAC: Negative for any chest pain, palpitation, Dyspnea on exertion, peripheral edema  - PULMONARY: mild intermittent cough episodes at night since having the COVID' Negative for any SOB, PALMER, or wheezing    - GASTROINTESTINAL: Negative for any abdominal pain, N/V/D/C, blood in stool    - GENITOURINARY: Negative for any dysuria, hematuria, incontinence   - NEUROLOGIC: Negative for any muscle weakness, numbness/tingling, memory changes  - MUSCULOSKELETAL: Right Knee Pain - see HPI  Negative for any joint pains/swelling, limited ROM     - INTEGUMENTARY: Negative for any rashes, cuts/ lesions   - HEMATOLOGIC: Negative for any abnormal bruising, frequent infections or bleeding  Active Problem List     Patient Active Problem List   Diagnosis   (none) - all problems resolved or deleted       Objective     /73 (BP Location: Right arm, Patient Position: Sitting, Cuff Size: Standard)   Pulse 95   Temp (!) 97 1 °F (36 2 °C) (Temporal)   Wt 52 6 kg (116 lb)   BMI 20 55 kg/m²     Physical Exam  - GEN: Appears well, alert and oriented x 3, pleasant and cooperative, in no acute distress  - HEENT: Anicteric, mucous membranes moist, PERRL and EOMI   - NECK: No lymphadenopathy, JVD or carotid bruits   - HEART: RRR, normal S1 and S2, no murmurs, clicks, gallops or rubs   - LUNGS: Clear to auscultation bilaterally; no wheezes, rales, or rhonchi  - ABDOMEN: Normal bowel sounds, soft, no tenderness, no distention, no organomegaly or masses felt on exam    - EXTREMITIES: Peripheral pulses normal; no clubbing, cyanosis, or edema  - NEURO: No focal findings, CN II-XII are grossly intact  - Musculoskeletal: Right Knee; no effusion/swelling, Mild tenderness to palpation on medial patellar side, no clicking on F/E, negative anterior drawer test, negative patellar grinding test 5/5 strength, normal ROM, no swollen or erythematous joints     - SKIN: Normal without suspicious lesions on exposed skin    Pertinent Laboratory/Diagnostic Studies:  CBC:   Lab Results   Component Value Date/Time    WBC 2 39 (L) 01/24/2021 05:29 AM    RBC 4 36 01/24/2021 05:29 AM    HGB 13 6 01/24/2021 05:29 AM    HCT 40 5 01/24/2021 05:29 AM    MCV 93 01/24/2021 05:29 AM    MCH 31 2 01/24/2021 05:29 AM    MCHC 33 6 01/24/2021 05:29 AM    RDW 13 0 01/24/2021 05:29 AM    MPV 10 2 01/24/2021 05:29 AM     01/24/2021 05:29 AM    NRBC 0 01/24/2021 05:29 AM    NEUTOPHILPCT 60 01/23/2021 05:02 PM    LYMPHOPCT 13 (L) 01/24/2021 05:29 AM    LYMPHOPCT 31 01/23/2021 05:02 PM    MONOPCT 11 01/24/2021 05:29 AM    MONOPCT 9 01/23/2021 05:02 PM    EOSPCT 0 01/24/2021 05:29 AM    EOSPCT 0 01/23/2021 05:02 PM    BASOPCT 0 01/24/2021 05:29 AM    BASOPCT 0 01/23/2021 05:02 PM    NEUTROABS 3 33 01/23/2021 05:02 PM    LYMPHSABS 1 71 01/23/2021 05:02 PM    MONOSABS 0 49 01/23/2021 05:02 PM    EOSABS 0 00 01/24/2021 05:29 AM    EOSABS 0 01 01/23/2021 05:02 PM     Chemistry Profile:   Lab Results   Component Value Date/Time    K 4 7 01/24/2021 04:44 AM     01/24/2021 04:44 AM    CO2 26 01/24/2021 04:44 AM    BUN 10 01/24/2021 04:44 AM    CREATININE 0 53 (L) 01/24/2021 04:44 AM    GLUC 132 01/24/2021 04:44 AM    CALCIUM 9 0 01/24/2021 04:44 AM    CORRECTEDCA 9 8 01/24/2021 04:44 AM    MG 2 1 01/23/2021 05:02 PM    AST 58 (H) 01/24/2021 04:44 AM    ALT 67 01/24/2021 04:44 AM    ALKPHOS 226 (H) 01/24/2021 04:44 AM    EGFR 100 01/24/2021 04:44 AM     Coagulation Studies:   Lab Results   Component Value Date/Time    PROTIME 12 2 01/23/2021 05:02 PM    INR 0 90 01/23/2021 05:02 PM     Hepatology: No results found for: LIPASE, AMMONIA, AFP  Endocrine Studies: No results found for: HGBA1C, [id number], T3FREE, X4ZEXFP, FREET4, THYMICROANTI, THGAB, TRIG, CHOL, CHOLESTEROL, HDL, LDLC, LDLCALC, LDL, LDLDIRECT, NONHDLC, [id number], PTH, [id number]  Iron Studies:   Lab Results   Component Value Date/Time    FERRITIN 1,332 (H) 01/23/2021 05:02 PM       Current Medications     Current Outpatient Medications:     ascorbic acid (VITAMIN C) 1000 MG tablet, Take 1 tablet (1,000 mg total) by mouth every 12 (twelve) hours for 6 days (Patient not taking: Reported on 2/24/2021), Disp: 12 tablet, Rfl: 0    benzonatate (TESSALON) 200 MG capsule, Take 1 capsule (200 mg total) by mouth 3 (three) times a day as needed for cough (Patient not taking: Reported on 2/24/2021), Disp: 30 capsule, Rfl: 0    cholecalciferol (VITAMIN D3) 1,000 units tablet, Take 2 tablets (2,000 Units total) by mouth daily for 6 days (Patient not taking: Reported on 2/24/2021), Disp: 12 tablet, Rfl: 0    dextromethorphan-guaiFENesin (ROBITUSSIN DM)  mg/5 mL syrup, Take 5 mL by mouth daily at bedtime, Disp: 236 mL, Rfl: 1    Diclofenac Sodium (VOLTAREN) 1 %, Apply 2 g topically 3 (three) times a day as needed (right knee pain), Disp: 150 g, Rfl: 2    ibuprofen (MOTRIN) 400 mg tablet, Take 1 tablet (400 mg total) by mouth every 8 (eight) hours as needed for mild pain, Disp: 30 tablet, Rfl: 1    zinc sulfate (ZINCATE) 220 mg capsule, Take 1 capsule (220 mg total) by mouth daily for 6 doses (Patient not taking: Reported on 2/24/2021), Disp: 6 capsule, Rfl: 0    Zinc Sulfate 220 (50 Zn) MG TABS, TAKE 1 CAPSULE (220 MG TOTAL) BY MOUTH DAILY FOR 6 DOSES, Disp: , Rfl:     Health Maintenance     Health Maintenance   Topic Date Due    MAMMOGRAM  1955    HIV Screening  10/10/1970    COVID-19 Vaccine (1 of 2) 10/10/1971    Annual Physical  10/10/1973    Colorectal Cancer Screening  10/10/2005    Influenza Vaccine (1) 06/30/2021 (Originally 9/1/2020)    Pneumococcal Vaccine: 65+ Years (1 of 1 - PPSV23) 02/24/2022 (Originally 10/10/2020)    DTaP,Tdap,and Td Vaccines (1 - Tdap) 02/24/2022 (Originally 10/10/1976)    Fall Risk  02/24/2022    Depression Screening PHQ  02/24/2022    BMI: Adult  02/24/2022    Hepatitis C Screening  Completed    HIB Vaccine  Aged Out    Hepatitis B Vaccine  Aged Out    IPV Vaccine  Aged Out    Hepatitis A Vaccine  Aged Out    Meningococcal ACWY Vaccine  Aged Out    HPV Vaccine  Aged Out       There is no immunization history on file for this patient      Bailee Ng DO  Internal Medicine  PGY-1  2/24/2021 11:03 AM